# Patient Record
Sex: MALE | Race: WHITE | NOT HISPANIC OR LATINO | URBAN - METROPOLITAN AREA
[De-identification: names, ages, dates, MRNs, and addresses within clinical notes are randomized per-mention and may not be internally consistent; named-entity substitution may affect disease eponyms.]

---

## 2019-03-26 ENCOUNTER — EMERGENCY (EMERGENCY)
Facility: HOSPITAL | Age: 49
LOS: 1 days | Discharge: HOME | End: 2019-03-26
Attending: EMERGENCY MEDICINE
Payer: COMMERCIAL

## 2019-03-26 VITALS
HEART RATE: 74 BPM | RESPIRATION RATE: 18 BRPM | HEIGHT: 72 IN | TEMPERATURE: 98 F | OXYGEN SATURATION: 99 % | WEIGHT: 270.07 LBS | DIASTOLIC BLOOD PRESSURE: 125 MMHG

## 2019-03-26 VITALS — DIASTOLIC BLOOD PRESSURE: 71 MMHG | SYSTOLIC BLOOD PRESSURE: 113 MMHG

## 2019-03-26 PROCEDURE — 96375 TX/PRO/DX INJ NEW DRUG ADDON: CPT

## 2019-03-26 PROCEDURE — 99284 EMERGENCY DEPT VISIT MOD MDM: CPT | Mod: 25

## 2019-03-26 PROCEDURE — 99284 EMERGENCY DEPT VISIT MOD MDM: CPT

## 2019-03-26 PROCEDURE — 72100 X-RAY EXAM L-S SPINE 2/3 VWS: CPT | Mod: 26

## 2019-03-26 PROCEDURE — 72100 X-RAY EXAM L-S SPINE 2/3 VWS: CPT

## 2019-03-26 PROCEDURE — 96374 THER/PROPH/DIAG INJ IV PUSH: CPT

## 2019-03-26 RX ORDER — ACETAMINOPHEN 500 MG
650 TABLET ORAL ONCE
Qty: 0 | Refills: 0 | Status: COMPLETED | OUTPATIENT
Start: 2019-03-26 | End: 2019-03-26

## 2019-03-26 RX ORDER — METHOCARBAMOL 500 MG/1
1500 TABLET, FILM COATED ORAL ONCE
Qty: 0 | Refills: 0 | Status: COMPLETED | OUTPATIENT
Start: 2019-03-26 | End: 2019-03-26

## 2019-03-26 RX ORDER — TRAMADOL HYDROCHLORIDE 50 MG/1
1 TABLET ORAL
Qty: 12 | Refills: 0
Start: 2019-03-26 | End: 2019-03-28

## 2019-03-26 RX ORDER — MORPHINE SULFATE 50 MG/1
4 CAPSULE, EXTENDED RELEASE ORAL ONCE
Qty: 0 | Refills: 0 | Status: DISCONTINUED | OUTPATIENT
Start: 2019-03-26 | End: 2019-03-26

## 2019-03-26 RX ORDER — KETOROLAC TROMETHAMINE 30 MG/ML
30 SYRINGE (ML) INJECTION ONCE
Qty: 0 | Refills: 0 | Status: DISCONTINUED | OUTPATIENT
Start: 2019-03-26 | End: 2019-03-26

## 2019-03-26 RX ADMIN — Medication 125 MILLIGRAM(S): at 17:49

## 2019-03-26 RX ADMIN — MORPHINE SULFATE 4 MILLIGRAM(S): 50 CAPSULE, EXTENDED RELEASE ORAL at 17:50

## 2019-03-26 RX ADMIN — Medication 650 MILLIGRAM(S): at 16:36

## 2019-03-26 RX ADMIN — Medication 30 MILLIGRAM(S): at 16:36

## 2019-03-26 RX ADMIN — METHOCARBAMOL 1500 MILLIGRAM(S): 500 TABLET, FILM COATED ORAL at 16:36

## 2019-03-26 NOTE — ED PROVIDER NOTE - CLINICAL SUMMARY MEDICAL DECISION MAKING FREE TEXT BOX
Patient s/p fall down stairs with back pain no signs of trauma.  Xray  negative for fracture.  Patient given medication for pain with improvement in the ER.  he was discharged with medrol dosepack and pain medication.

## 2019-03-26 NOTE — ED ADULT NURSE NOTE - OBJECTIVE STATEMENT
Pt  arrives to ED s/p slip and fall down 4-5 steps in to his basement. Pt states he slipped on a toy while carrying water. Pt states he has hx of back pain and sees pain management and this fall has exacerbated his pain. Pt A & OX4, denies LOC. PT A & O X4.  No obvious signs of trauma noted.

## 2019-03-26 NOTE — ED PROVIDER NOTE - OBJECTIVE STATEMENT
This patient is a 48 year old man with hx of herniated lumbar discs who presents to the ER c/o back pain s/p fall.  Patient states that he tripped over his child's toy that was on the stairs and fell down about 6 steps hitting his lower back.  No LOC.  He states that he did not hit his head.  Pain reported to be 8/10 in severity in his lower back and radiating down his left leg.

## 2019-03-26 NOTE — ED ADULT TRIAGE NOTE - CHIEF COMPLAINT QUOTE
pt a&ox3, states slipped and fell down 6 basement steps, reports lower back pain. denies hitting head or LOC.

## 2019-09-12 ENCOUNTER — EMERGENCY (EMERGENCY)
Facility: HOSPITAL | Age: 49
LOS: 1 days | Discharge: HOME | End: 2019-09-12
Attending: STUDENT IN AN ORGANIZED HEALTH CARE EDUCATION/TRAINING PROGRAM
Payer: COMMERCIAL

## 2019-09-12 VITALS
HEIGHT: 72 IN | DIASTOLIC BLOOD PRESSURE: 93 MMHG | SYSTOLIC BLOOD PRESSURE: 157 MMHG | OXYGEN SATURATION: 99 % | TEMPERATURE: 99 F | HEART RATE: 74 BPM | WEIGHT: 271.17 LBS | RESPIRATION RATE: 18 BRPM

## 2019-09-12 VITALS
DIASTOLIC BLOOD PRESSURE: 66 MMHG | SYSTOLIC BLOOD PRESSURE: 130 MMHG | HEART RATE: 74 BPM | RESPIRATION RATE: 18 BRPM | OXYGEN SATURATION: 98 %

## 2019-09-12 PROBLEM — M54.9 DORSALGIA, UNSPECIFIED: Chronic | Status: ACTIVE | Noted: 2019-03-26

## 2019-09-12 LAB
ALBUMIN SERPL ELPH-MCNC: 4.5 G/DL — SIGNIFICANT CHANGE UP (ref 3.3–5.2)
ALP SERPL-CCNC: 126 U/L — HIGH (ref 40–120)
ALT FLD-CCNC: 76 U/L — HIGH
ANION GAP SERPL CALC-SCNC: 13 MMOL/L — SIGNIFICANT CHANGE UP (ref 5–17)
APTT BLD: 33.3 SEC — SIGNIFICANT CHANGE UP (ref 27.5–36.3)
AST SERPL-CCNC: 45 U/L — HIGH
BASOPHILS # BLD AUTO: 0.03 K/UL — SIGNIFICANT CHANGE UP (ref 0–0.2)
BASOPHILS NFR BLD AUTO: 0.5 % — SIGNIFICANT CHANGE UP (ref 0–2)
BILIRUB SERPL-MCNC: 0.5 MG/DL — SIGNIFICANT CHANGE UP (ref 0.4–2)
BUN SERPL-MCNC: 21 MG/DL — HIGH (ref 8–20)
CALCIUM SERPL-MCNC: 9.1 MG/DL — SIGNIFICANT CHANGE UP (ref 8.6–10.2)
CHLORIDE SERPL-SCNC: 100 MMOL/L — SIGNIFICANT CHANGE UP (ref 98–107)
CO2 SERPL-SCNC: 25 MMOL/L — SIGNIFICANT CHANGE UP (ref 22–29)
CREAT SERPL-MCNC: 1.01 MG/DL — SIGNIFICANT CHANGE UP (ref 0.5–1.3)
EOSINOPHIL # BLD AUTO: 0.2 K/UL — SIGNIFICANT CHANGE UP (ref 0–0.5)
EOSINOPHIL NFR BLD AUTO: 3.1 % — SIGNIFICANT CHANGE UP (ref 0–6)
GLUCOSE SERPL-MCNC: 130 MG/DL — HIGH (ref 70–115)
HCT VFR BLD CALC: 43.7 % — SIGNIFICANT CHANGE UP (ref 39–50)
HGB BLD-MCNC: 14.8 G/DL — SIGNIFICANT CHANGE UP (ref 13–17)
IMM GRANULOCYTES NFR BLD AUTO: 0.3 % — SIGNIFICANT CHANGE UP (ref 0–1.5)
INR BLD: 0.96 RATIO — SIGNIFICANT CHANGE UP (ref 0.88–1.16)
LYMPHOCYTES # BLD AUTO: 2.21 K/UL — SIGNIFICANT CHANGE UP (ref 1–3.3)
LYMPHOCYTES # BLD AUTO: 33.9 % — SIGNIFICANT CHANGE UP (ref 13–44)
MCHC RBC-ENTMCNC: 30.6 PG — SIGNIFICANT CHANGE UP (ref 27–34)
MCHC RBC-ENTMCNC: 33.9 GM/DL — SIGNIFICANT CHANGE UP (ref 32–36)
MCV RBC AUTO: 90.5 FL — SIGNIFICANT CHANGE UP (ref 80–100)
MONOCYTES # BLD AUTO: 0.64 K/UL — SIGNIFICANT CHANGE UP (ref 0–0.9)
MONOCYTES NFR BLD AUTO: 9.8 % — SIGNIFICANT CHANGE UP (ref 2–14)
NEUTROPHILS # BLD AUTO: 3.41 K/UL — SIGNIFICANT CHANGE UP (ref 1.8–7.4)
NEUTROPHILS NFR BLD AUTO: 52.4 % — SIGNIFICANT CHANGE UP (ref 43–77)
PLATELET # BLD AUTO: 233 K/UL — SIGNIFICANT CHANGE UP (ref 150–400)
POTASSIUM SERPL-MCNC: 4 MMOL/L — SIGNIFICANT CHANGE UP (ref 3.5–5.3)
POTASSIUM SERPL-SCNC: 4 MMOL/L — SIGNIFICANT CHANGE UP (ref 3.5–5.3)
PROT SERPL-MCNC: 7.3 G/DL — SIGNIFICANT CHANGE UP (ref 6.6–8.7)
PROTHROM AB SERPL-ACNC: 11 SEC — SIGNIFICANT CHANGE UP (ref 10–12.9)
RBC # BLD: 4.83 M/UL — SIGNIFICANT CHANGE UP (ref 4.2–5.8)
RBC # FLD: 12.6 % — SIGNIFICANT CHANGE UP (ref 10.3–14.5)
SODIUM SERPL-SCNC: 138 MMOL/L — SIGNIFICANT CHANGE UP (ref 135–145)
TROPONIN T SERPL-MCNC: <0.01 NG/ML — SIGNIFICANT CHANGE UP (ref 0–0.06)
WBC # BLD: 6.51 K/UL — SIGNIFICANT CHANGE UP (ref 3.8–10.5)
WBC # FLD AUTO: 6.51 K/UL — SIGNIFICANT CHANGE UP (ref 3.8–10.5)

## 2019-09-12 PROCEDURE — 93005 ELECTROCARDIOGRAM TRACING: CPT

## 2019-09-12 PROCEDURE — 82962 GLUCOSE BLOOD TEST: CPT

## 2019-09-12 PROCEDURE — 70450 CT HEAD/BRAIN W/O DYE: CPT

## 2019-09-12 PROCEDURE — 71045 X-RAY EXAM CHEST 1 VIEW: CPT | Mod: 26

## 2019-09-12 PROCEDURE — 70450 CT HEAD/BRAIN W/O DYE: CPT | Mod: 26

## 2019-09-12 PROCEDURE — 85027 COMPLETE CBC AUTOMATED: CPT

## 2019-09-12 PROCEDURE — 96375 TX/PRO/DX INJ NEW DRUG ADDON: CPT

## 2019-09-12 PROCEDURE — 93010 ELECTROCARDIOGRAM REPORT: CPT

## 2019-09-12 PROCEDURE — 80053 COMPREHEN METABOLIC PANEL: CPT

## 2019-09-12 PROCEDURE — 85610 PROTHROMBIN TIME: CPT

## 2019-09-12 PROCEDURE — 84484 ASSAY OF TROPONIN QUANT: CPT

## 2019-09-12 PROCEDURE — 96374 THER/PROPH/DIAG INJ IV PUSH: CPT

## 2019-09-12 PROCEDURE — 85730 THROMBOPLASTIN TIME PARTIAL: CPT

## 2019-09-12 PROCEDURE — 99284 EMERGENCY DEPT VISIT MOD MDM: CPT

## 2019-09-12 PROCEDURE — 99285 EMERGENCY DEPT VISIT HI MDM: CPT | Mod: 25

## 2019-09-12 PROCEDURE — 36415 COLL VENOUS BLD VENIPUNCTURE: CPT

## 2019-09-12 PROCEDURE — 71045 X-RAY EXAM CHEST 1 VIEW: CPT

## 2019-09-12 RX ORDER — METOCLOPRAMIDE HCL 10 MG
10 TABLET ORAL ONCE
Refills: 0 | Status: COMPLETED | OUTPATIENT
Start: 2019-09-12 | End: 2019-09-12

## 2019-09-12 RX ORDER — ACETAMINOPHEN 500 MG
975 TABLET ORAL ONCE
Refills: 0 | Status: COMPLETED | OUTPATIENT
Start: 2019-09-12 | End: 2019-09-12

## 2019-09-12 RX ORDER — KETOROLAC TROMETHAMINE 30 MG/ML
15 SYRINGE (ML) INJECTION ONCE
Refills: 0 | Status: DISCONTINUED | OUTPATIENT
Start: 2019-09-12 | End: 2019-09-12

## 2019-09-12 RX ADMIN — Medication 10 MILLIGRAM(S): at 01:00

## 2019-09-12 RX ADMIN — Medication 15 MILLIGRAM(S): at 03:44

## 2019-09-12 RX ADMIN — Medication 975 MILLIGRAM(S): at 01:51

## 2019-09-12 NOTE — ED ADULT TRIAGE NOTE - CHIEF COMPLAINT QUOTE
patient biba states that approx 30 minutes ago states that he was working when he started to have a pounding headache, blurry vision in his right eye, became dizzy and nauseous. patient states that the blurry vision is improving. dr erickson at [facility] for ang at 0012

## 2019-09-12 NOTE — ED ADULT NURSE NOTE - CHIEF COMPLAINT QUOTE
patient biba states that approx 30 minutes ago states that he was working when he started to have a pounding headache, blurry vision in his right eye, became dizzy and nauseous. patient states that the blurry vision is improving. dr erickson at [facility] for ang at 0012

## 2019-09-12 NOTE — ED ADULT NURSE NOTE - OBJECTIVE STATEMENT
Patient presents to ER complaining of headache and right sided blurry vision, onset of symptoms about 1 hr ago, equal strength noted in all extremities, no droop noted, resp even/unlabored, lungs CTAB.

## 2019-09-12 NOTE — ED PROVIDER NOTE - PATIENT PORTAL LINK FT
You can access the FollowMyHealth Patient Portal offered by [facility] by registering at the following website: [email or web address]. By joining [employer]’s FollowMyHealth portal, you will also be able to view your health information using other applications (apps) compatible with our system.

## 2019-09-12 NOTE — ED PROVIDER NOTE - OBJECTIVE STATEMENT
48 y/o M with no significant PMHx presents to ED c/o severe HA with gradual onset 30 minutes ago getting worse. Associated sxs are right sided blurry vision and nausea. Pt was at [employer], where he experienced sudden CINTHIA HA with right sided blurry vision. HA was initially frontal, then spread to posterior region. Pt denies fevers/chills, head trauma, loc, focal neuro deficits, cp/sob/palp, cough, abd pain/v/d, urinary symptoms, recent travel and sick contacts.   Last Known Well: 11:40 pm

## 2019-09-12 NOTE — ED PROVIDER NOTE - PROGRESS NOTE DETAILS
MD arrived at bedside Spoke with neurology, Tami Jacobo NP. Will have telestroke neurologist call back. Delay of CT scan due to pt on table Spoke with Dr. Zavala, will cancel code stroke since pt is only experiencing blurred vision at this time, no visual field deficits, pt is not a TPA candidate at this time PT seen and reassessed.  Patient symptomatically improved.   AAOX3, NAD, VSS.  Discussed test results w/ patient, given copy of results. Patient verbalized understanding of hospital course and outpatient plans, has decisional making capacity.  Will follow-up with Primary care doctor in the next 2 days; patient will call for an appointment. Will return to the ED if there is any worsening of symptoms.  Patient able to ambulate w/o difficulty, is tolerating PO intake

## 2019-09-12 NOTE — ED ADULT NURSE REASSESSMENT NOTE - NS ED NURSE REASSESS COMMENT FT1
Pt. safe for discharge as per provider. Vital signs stable and as charted. Pt. at baseline neuro status. Discussed discharge teaching with pt, pt verbalized understanding. IV catheter discontinued. Pt. discharged as per orders.

## 2019-09-12 NOTE — ED PROVIDER NOTE - CLINICAL SUMMARY MEDICAL DECISION MAKING FREE TEXT BOX
48 y/o M with no significant PMHx presents to ED c/o severe HA with gradual onset 30 minutes ago getting worse. 48 y/o M with no significant PMHx presents to ED c/o severe HA with gradual onset 30 minutes ago getting worse - pt likely with complex migraine - improved with meds, no acute findings on CT, no actual visual field deficit, follow up with pmd and neuro

## 2020-01-02 ENCOUNTER — EMERGENCY (EMERGENCY)
Facility: HOSPITAL | Age: 50
LOS: 1 days | Discharge: HOME | End: 2020-01-02
Attending: EMERGENCY MEDICINE
Payer: COMMERCIAL

## 2020-01-02 VITALS
WEIGHT: 210.1 LBS | HEIGHT: 71 IN | DIASTOLIC BLOOD PRESSURE: 66 MMHG | RESPIRATION RATE: 16 BRPM | HEART RATE: 71 BPM | OXYGEN SATURATION: 95 % | TEMPERATURE: 98 F | SYSTOLIC BLOOD PRESSURE: 118 MMHG

## 2020-01-02 PROCEDURE — 96374 THER/PROPH/DIAG INJ IV PUSH: CPT

## 2020-01-02 PROCEDURE — 96375 TX/PRO/DX INJ NEW DRUG ADDON: CPT

## 2020-01-02 PROCEDURE — 99284 EMERGENCY DEPT VISIT MOD MDM: CPT

## 2020-01-02 PROCEDURE — 99284 EMERGENCY DEPT VISIT MOD MDM: CPT | Mod: 25

## 2020-01-02 RX ORDER — KETOROLAC TROMETHAMINE 30 MG/ML
30 SYRINGE (ML) INJECTION ONCE
Refills: 0 | Status: DISCONTINUED | OUTPATIENT
Start: 2020-01-02 | End: 2020-01-02

## 2020-01-02 RX ORDER — DIAZEPAM 5 MG
5 TABLET ORAL ONCE
Refills: 0 | Status: DISCONTINUED | OUTPATIENT
Start: 2020-01-02 | End: 2020-01-02

## 2020-01-02 RX ORDER — CYCLOBENZAPRINE HYDROCHLORIDE 10 MG/1
1 TABLET, FILM COATED ORAL
Qty: 9 | Refills: 0
Start: 2020-01-02 | End: 2020-01-04

## 2020-01-02 RX ORDER — DEXAMETHASONE 0.5 MG/5ML
10 ELIXIR ORAL ONCE
Refills: 0 | Status: COMPLETED | OUTPATIENT
Start: 2020-01-02 | End: 2020-01-02

## 2020-01-02 RX ADMIN — Medication 5 MILLIGRAM(S): at 18:41

## 2020-01-02 RX ADMIN — Medication 30 MILLIGRAM(S): at 18:42

## 2020-01-02 RX ADMIN — Medication 10 MILLIGRAM(S): at 18:45

## 2020-01-02 NOTE — ED PROVIDER NOTE - PATIENT PORTAL LINK FT
You can access the FollowMyHealth Patient Portal offered by [facility] by registering at the following website: [email or web address]. By joining [employer]’s FollowMyHealth portal, you will also be able to view your health information using other applications (apps) compatible with our system.

## 2020-01-02 NOTE — ED PROVIDER NOTE - PROGRESS NOTE DETAILS
PA NOTE: improvement in sx s/p treatment. Will treat with course of PO flexeril. Advised to make follow up with primary neuro surgeon / pain management MD upon discharge and return to ED if symptoms worsen.

## 2020-01-02 NOTE — ED PROVIDER NOTE - PHYSICAL EXAMINATION
MSK: Ambulating independently in ED. TTP L sided paraspinal muscle at thoracic region. 5/5 str and full ROM b/l upper and lower extrems.

## 2020-01-02 NOTE — ED PROVIDER NOTE - CLINICAL SUMMARY MEDICAL DECISION MAKING FREE TEXT BOX
49m with acute exacerbation of cryonic back pain. No neuro deficits. VSS. Ambulating independently in ED. Will treat pain and reassess.

## 2020-01-02 NOTE — ED ADULT TRIAGE NOTE - CHIEF COMPLAINT QUOTE
Patient A&Ox4 complaining of back pain, chronic in nature, stated has open workers comprehension case secondary to sustaining back injury while at work. Stated pain worse today, did not take anything prior to calling ambulance, received 100mcg Fentanyl by EMS PTA. Has 20g to right AC.

## 2020-01-02 NOTE — ED PROVIDER NOTE - OBJECTIVE STATEMENT
Pt is a 48 y/o m, PMH significant for chronic back pain, presents to ED c/o exacerbation of lower back pain x 1 day. Pt states he had chronic lower back pain stemming from a injury at work in 2012. Pt currently sees Dr. Mae ( Neuro sx ) for his back pain and recently received a nerve block this November. Pt states he worked overnight ( no heavy lifting / strenuous activity ) and woke up at approximately 1:00pm with severe pain in his lower back radiating into his LLE. Pt states the pain is similar to symptoms he has experienced in the past. No other complaints at this time. Denies fevers, chills, nausea, vomiting, HA, dizziness, recent weight loss, weakness.

## 2020-01-02 NOTE — ED PROVIDER NOTE - ATTENDING CONTRIBUTION TO CARE
Pt is a 48 y/o m, chronic back pain, presents to ED c/o exacerbation of lower back pain x 1 day. 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 Pt currently sees Dr. Mae ( Neuro sx ) for his back pain and recently received a nerve block this November. Pt states he worked overnight ( no heavy lifting / strenuous activity ) and woke up at approximately 1:00pm with severe pain in his lower back radiating into his LLE.   Similar to past sx, no b/b incontinence  plan pain control and out fu--pe positive for l paraspinal muslce spasm in t/l/s spine

## 2020-09-30 ENCOUNTER — EMERGENCY (EMERGENCY)
Facility: HOSPITAL | Age: 50
LOS: 1 days | Discharge: HOME | End: 2020-09-30
Attending: EMERGENCY MEDICINE
Payer: COMMERCIAL

## 2020-09-30 VITALS
HEART RATE: 77 BPM | TEMPERATURE: 98 F | RESPIRATION RATE: 18 BRPM | SYSTOLIC BLOOD PRESSURE: 157 MMHG | HEIGHT: 71 IN | OXYGEN SATURATION: 98 % | DIASTOLIC BLOOD PRESSURE: 91 MMHG

## 2020-09-30 PROCEDURE — 99284 EMERGENCY DEPT VISIT MOD MDM: CPT | Mod: 25

## 2020-09-30 PROCEDURE — 72128 CT CHEST SPINE W/O DYE: CPT | Mod: 26

## 2020-09-30 PROCEDURE — 99285 EMERGENCY DEPT VISIT HI MDM: CPT

## 2020-09-30 PROCEDURE — 96372 THER/PROPH/DIAG INJ SC/IM: CPT

## 2020-09-30 PROCEDURE — 99053 MED SERV 10PM-8AM 24 HR FAC: CPT

## 2020-09-30 PROCEDURE — 72125 CT NECK SPINE W/O DYE: CPT

## 2020-09-30 PROCEDURE — 72125 CT NECK SPINE W/O DYE: CPT | Mod: 26

## 2020-09-30 PROCEDURE — 72128 CT CHEST SPINE W/O DYE: CPT

## 2020-09-30 RX ORDER — IBUPROFEN 200 MG
800 TABLET ORAL ONCE
Refills: 0 | Status: COMPLETED | OUTPATIENT
Start: 2020-09-30 | End: 2020-09-30

## 2020-09-30 RX ORDER — DIAZEPAM 5 MG
10 TABLET ORAL ONCE
Refills: 0 | Status: DISCONTINUED | OUTPATIENT
Start: 2020-09-30 | End: 2020-09-30

## 2020-09-30 RX ORDER — DEXAMETHASONE 0.5 MG/5ML
10 ELIXIR ORAL ONCE
Refills: 0 | Status: COMPLETED | OUTPATIENT
Start: 2020-09-30 | End: 2020-09-30

## 2020-09-30 RX ORDER — OXYCODONE AND ACETAMINOPHEN 5; 325 MG/1; MG/1
1 TABLET ORAL
Qty: 18 | Refills: 0
Start: 2020-09-30 | End: 2020-10-02

## 2020-09-30 RX ORDER — CYCLOBENZAPRINE HYDROCHLORIDE 10 MG/1
1 TABLET, FILM COATED ORAL
Qty: 30 | Refills: 0
Start: 2020-09-30 | End: 2020-10-09

## 2020-09-30 RX ADMIN — Medication 10 MILLIGRAM(S): at 04:49

## 2020-09-30 RX ADMIN — Medication 800 MILLIGRAM(S): at 04:43

## 2020-09-30 RX ADMIN — Medication 10 MILLIGRAM(S): at 04:43

## 2020-09-30 NOTE — ED PROVIDER NOTE - PATIENT PORTAL LINK FT
You can access the FollowMyHealth Patient Portal offered by [facility] by registering at the following website: [email or web address]. By joining [employer]’s FollowMyHealth portal, you will also be able to view your health information using other applications (apps) compatible with our system.

## 2020-09-30 NOTE — ED ADULT NURSE NOTE - OBJECTIVE STATEMENT
Pt c/o 8/10 upper back pain radiating to left arm with associated numbness and tingling. 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 Pt immediately felt a stabbing, burning pain. Pt has a hx of lower back herniations and has had previous treatment with epidurals and nerve blocks that has given him relief.

## 2020-09-30 NOTE — ED PROVIDER NOTE - NSFOLLOWUPINSTRUCTIONS_ED_ALL_ED_FT
Anaprox S 2x daily with food as needed for pain  Flexeril 10 mg 3x daily  Medrol dose josué starting this evening with food as instructed  Follow up with your Spine and Pain Management doctor-call to schedule appts  Return sooner for any problems

## 2020-09-30 NOTE — ED ADULT TRIAGE NOTE - CHIEF COMPLAINT QUOTE
Pt was helping lift a "heavy set woman who fell out of her chair and heard a pop in the upper back, pain shooting down the L arm into the L hand and upper back pain."

## 2020-09-30 NOTE — ED PROVIDER NOTE - OBJECTIVE STATEMENT
51 yo M [employer]  presents to ED ambulatory c/o upper mid back pain with LUE tingling after feeling a "pop" after responding to a a call for lift assist of heavy patient approx 45 min PTA.  Pt with hx of herniated discs in lower back L4/5, L5/S1 for which he has been seen by Neurosurgery/Dr. Xavier and has had injections by pain management in the past.  Pt denies any lower back pain or weakness.  no assoc CP, SOB, abd pain, or N/V.

## 2020-09-30 NOTE — ED PROVIDER NOTE - PROGRESS NOTE DETAILS
CT results as noted.  Pt stable for d/c with Spine and Pain Management f/u as outpt.  Rx Anaprox DS, Flexeril and Medrol Dosepack as outpt

## 2021-04-13 ENCOUNTER — APPOINTMENT (OUTPATIENT)
Dept: ORTHOPEDIC SURGERY | Facility: CLINIC | Age: 51
End: 2021-04-13
Payer: COMMERCIAL

## 2021-04-13 VITALS
HEART RATE: 71 BPM | DIASTOLIC BLOOD PRESSURE: 77 MMHG | WEIGHT: 275 LBS | OXYGEN SATURATION: 96 % | SYSTOLIC BLOOD PRESSURE: 138 MMHG | BODY MASS INDEX: 38.5 KG/M2 | HEIGHT: 71 IN

## 2021-04-13 DIAGNOSIS — M25.562 PAIN IN LEFT KNEE: ICD-10-CM

## 2021-04-13 DIAGNOSIS — Z78.9 OTHER SPECIFIED HEALTH STATUS: ICD-10-CM

## 2021-04-13 DIAGNOSIS — Z87.39 PERSONAL HISTORY OF OTHER DISEASES OF THE MUSCULOSKELETAL SYSTEM AND CONNECTIVE TISSUE: ICD-10-CM

## 2021-04-13 DIAGNOSIS — Z87.09 PERSONAL HISTORY OF OTHER DISEASES OF THE RESPIRATORY SYSTEM: ICD-10-CM

## 2021-04-13 PROCEDURE — 99204 OFFICE O/P NEW MOD 45 MIN: CPT

## 2021-04-13 PROCEDURE — 99072 ADDL SUPL MATRL&STAF TM PHE: CPT

## 2021-04-13 PROCEDURE — 73564 X-RAY EXAM KNEE 4 OR MORE: CPT | Mod: LT

## 2021-04-13 RX ORDER — TIZANIDINE 4 MG/1
4 TABLET ORAL
Qty: 63 | Refills: 0 | Status: ACTIVE | COMMUNITY
Start: 2020-10-20

## 2021-04-13 NOTE — DISCUSSION/SUMMARY
[de-identified] : The patient is a 51 year old male with bone on bone varus arthritis of the left knee. Conservative options were discussed. Visco Supplementation was ordered. He was given a prescription for physical therapy and anti-inflammatories. I recommended a course of Mobic. The patient was given a prescription for the Mobic with directions. He was instructed to stop the medicine and call the office if there are any adverse reaction to the medicine. He was also instructed to consult with their primary care doctor prior to starting the medication. He may be a future candidate for left knee replacement once conservative treatment has been exhausted. He may follow up for Visco Supplementation once it is approved.

## 2021-04-13 NOTE — PHYSICAL EXAM
[de-identified] : The patient appears well nourished  and in no apparent distress.  The patient is alert and oriented to person, place, and time.   Affect and mood appear normal. The head is normocephalic and atraumatic.  The eyes reveal normal sclera and extra ocular muscles are intact. The tongue is midline with no apparent lesions.  Skin shows normal turgor with no evidence of eczema or psoriasis.  No respiratory distress noted.  Sensation grossly intact.\par   [de-identified] : Exam of the left knee shows a small effusion, 5 degrees of varus, less than 3 mm laxity of the LCL, -5 to 110 degrees of flexion measured with a goniometer. 5/5 motor strength bilaterally distally. Sensation intact distally.  [de-identified] : Xray- 4 views of the left knee shows bone on bone varus arthritis of the left knee.\par \par MRI - performed at [facility] on 4/13/2021 of the left knee- \par \par  Delamination of a small near full-thickness chondral flap at the posterior weightbearing aspect of the lateral femoral condyle and a shallow radial tear at the free edge of the lateral meniscus.\par \par There is moderate to severe medial compartment osteoarthritis and prominent truncation of the posterior horn of the medial meniscus is indicative of a chronic tear. Meniscal body is extruded into the medial gutter. There is reactive bone ,marrow edema within the medial tibial plateau.\par \par Moderate patellofemoral arthritis.

## 2021-04-13 NOTE — ADDENDUM
[FreeTextEntry1] : This note was authored by Khris Delatorre working as a medical scribe for Dr. Ronan Calvo. The note was reviewed, edited, and revised by Dr. Ronan Calvo whom is in agreement with the exam findings, imaging findings, and treatment plan. 04/13/2021.

## 2021-04-13 NOTE — HISTORY OF PRESENT ILLNESS
[de-identified] : The patient is a 51 year old male being seen for evaluation of his left. He reports on April 3rd waking up with significant pain. He reports seeing is primary care physician and being sent for an MRI. He reports being diagnosed with osteoarthritis and being referred to orthopedics for evaluation. He is ambulating and transferring with episodes of severe pain. He reports pain is localized to the medial knee. He reports intermittent swelling and aching sensations. Pain is worse with transferring and weightbearing activities, most especially stair climbing. He reports undergoing physical therapy with mild relief of his symptoms. He comes in today for evaluation of his left knee and for treatment options.

## 2021-04-29 ENCOUNTER — RX RENEWAL (OUTPATIENT)
Age: 51
End: 2021-04-29

## 2021-06-01 ENCOUNTER — APPOINTMENT (OUTPATIENT)
Dept: ORTHOPEDIC SURGERY | Facility: CLINIC | Age: 51
End: 2021-06-01
Payer: COMMERCIAL

## 2021-06-01 VITALS — BODY MASS INDEX: 38.5 KG/M2 | HEIGHT: 71 IN | WEIGHT: 275 LBS

## 2021-06-01 PROCEDURE — 20610 DRAIN/INJ JOINT/BURSA W/O US: CPT | Mod: LT

## 2021-06-01 PROCEDURE — 99213 OFFICE O/P EST LOW 20 MIN: CPT | Mod: 25

## 2021-06-01 PROCEDURE — 99072 ADDL SUPL MATRL&STAF TM PHE: CPT

## 2021-06-01 RX ORDER — HYALURONATE SODIUM 10 MG/ML
25 SYRINGE (ML) INTRAARTICULAR
Qty: 5 | Refills: 0 | Status: DISCONTINUED | OUTPATIENT
Start: 2021-05-06 | End: 2021-06-01

## 2021-06-01 RX ORDER — HYALURONATE SODIUM 20 MG/2 ML
20 SYRINGE (ML) INTRAARTICULAR
Qty: 1 | Refills: 0 | Status: DISCONTINUED | OUTPATIENT
Start: 2021-05-13 | End: 2021-06-01

## 2021-06-01 NOTE — DISCUSSION/SUMMARY
[de-identified] : The patient is a 51 year old male with bone on bone varus arthritis of the left knee. Conservative options were discussed. Patient received the first of three Euflexxa injections to the left knee using sterile technique and ultrasound guidance. He tolerated well. Patient will ice and elevate the knee when home. Follow up in one week.

## 2021-06-01 NOTE — REASON FOR VISIT
[Follow-Up Visit] : a follow-up visit for [Other: ____] : [unfilled] [FreeTextEntry2] : Left knee Euflexxa inj#1. Lot# [id number] Expires on 2021/11/14.

## 2021-06-01 NOTE — PHYSICAL EXAM
[de-identified] : The patient appears well nourished  and in no apparent distress.  The patient is alert and oriented to person, place, and time.   Affect and mood appear normal. The head is normocephalic and atraumatic.  The eyes reveal normal sclera and extra ocular muscles are intact. The tongue is midline with no apparent lesions.  Skin shows normal turgor with no evidence of eczema or psoriasis.  No respiratory distress noted.  Sensation grossly intact.\par   [de-identified] : Exam of the left knee shows a small effusion, 5 degrees of varus, less than 3 mm laxity of the LCL, -5 to 110 degrees of flexion measured with a goniometer. 5/5 motor strength bilaterally distally. Sensation intact distally.  [de-identified] : Previous Xray- 4 views of the left knee shows bone on bone varus arthritis of the left knee.\par \par MRI - performed at [facility] on 4/13/2021 of the left knee- \par \par  Delamination of a small near full-thickness chondral flap at the posterior weightbearing aspect of the lateral femoral condyle and a shallow radial tear at the free edge of the lateral meniscus.\par \par There is moderate to severe medial compartment osteoarthritis and prominent truncation of the posterior horn of the medial meniscus is indicative of a chronic tear. Meniscal body is extruded into the medial gutter. There is reactive bone ,marrow edema within the medial tibial plateau.\par \par Moderate patellofemoral arthritis.

## 2021-06-01 NOTE — HISTORY OF PRESENT ILLNESS
[de-identified] : The patient is a 51 year old male being seen for evaluation of his left. He reports on April 3rd waking up with significant pain. He reports seeing is primary care physician and being sent for an MRI. He reports being diagnosed with osteoarthritis and being referred to orthopedics for evaluation. He is ambulating and transferring with episodes of severe pain. He reports pain is localized to the medial knee. He reports intermittent swelling and aching sensations. Pain is worse with transferring and weightbearing activities, most especially stair climbing. He reports undergoing physical therapy with mild relief of his symptoms. He comes in today for the first of three Euflexxa injections to the left knee.

## 2021-06-01 NOTE — PROCEDURE
[de-identified] : Allergies: The patient denies allergies to medications and has no allergies to chicken,eggs, or feathers.\par Procedure: The patient has been identified by name and date of birth. Patient confirms that we are treating the left knee today.\par The knee was prepped in the usual sterile fashion. The knee joint space was identified using ultrasound. The area was cleansed with chlorhexadine, then sprayed with ethyl chloride. The patient was then injected with the Euflexxa into the left knee using ultrasound guidance  and the needle position in the superolateral joint space was confirmed. The patient tolerated the procedure well. The medication was delivered aseptically and atraumatically.\par Diagnosis: Osteoarthritis of the left knee. \par Treatment: The patient was advised on the activities for today. I gave the patient instructions on postinjection ice and analgesia.\par

## 2021-06-08 ENCOUNTER — APPOINTMENT (OUTPATIENT)
Dept: ORTHOPEDIC SURGERY | Facility: CLINIC | Age: 51
End: 2021-06-08
Payer: COMMERCIAL

## 2021-06-08 VITALS
HEIGHT: 71 IN | HEART RATE: 67 BPM | DIASTOLIC BLOOD PRESSURE: 69 MMHG | BODY MASS INDEX: 38.5 KG/M2 | WEIGHT: 275 LBS | SYSTOLIC BLOOD PRESSURE: 118 MMHG

## 2021-06-08 PROCEDURE — 99072 ADDL SUPL MATRL&STAF TM PHE: CPT

## 2021-06-08 PROCEDURE — 20610 DRAIN/INJ JOINT/BURSA W/O US: CPT

## 2021-06-08 NOTE — HISTORY OF PRESENT ILLNESS
[de-identified] : \par Patient presents today for second Euflexxa injection of the left knee.  He reports that he is started to notice some improvement in his left knee pain.  No postinjection complications are reported.\par \par Procedure site: \par Indication:  Osteoarthritis. \par Risk and benefits were discussed with the patient. Potential complications include bleeding and infection. Verbal consent was obtained prior to the procedure. \par Chloraprep was used to prep the area. ethyl chloride spray was used as a topical anesthetic. Using sterile technique, the aspiration/injection needle was then directed from a inferior lateral aspect. A 22-gauge was used to inject 2mL Euflexxa lot number [id number]. A bandage was applied. the patient tolerated the procedure well. \par Complications: none. \par Patient instructed to avoid strenuous activity for 1 day(s). \par Follow-up in the office in 1 week for third injection. \par

## 2021-06-08 NOTE — REASON FOR VISIT
[Follow-Up Visit] : a follow-up visit for [Other: ____] : [unfilled] [FreeTextEntry2] : Left knee Euflexxa inj#2. Lot# [id number] Expires on 2021/11/14. \par  \par

## 2021-06-15 ENCOUNTER — APPOINTMENT (OUTPATIENT)
Dept: ORTHOPEDIC SURGERY | Facility: CLINIC | Age: 51
End: 2021-06-15
Payer: COMMERCIAL

## 2021-06-15 VITALS
HEIGHT: 71 IN | WEIGHT: 275 LBS | BODY MASS INDEX: 38.5 KG/M2 | SYSTOLIC BLOOD PRESSURE: 118 MMHG | DIASTOLIC BLOOD PRESSURE: 75 MMHG | HEART RATE: 60 BPM

## 2021-06-15 PROCEDURE — 99072 ADDL SUPL MATRL&STAF TM PHE: CPT

## 2021-06-15 PROCEDURE — 20611 DRAIN/INJ JOINT/BURSA W/US: CPT | Mod: LT

## 2021-06-15 NOTE — HISTORY OF PRESENT ILLNESS
[de-identified] : Patient is here for the third Euflexxa injection for the left knees. \par Allergies: The patient denies allergies to medications and has no allergies to chicken,eggs, or feathers.\par Procedure: The patient has been identified by name and date of birth. Patient confirms that we are treating the left knee today.\par The knee was prepped in the usual sterile fashion. The knee joint space was identified using ultrasound. The area was cleansed with chlorhexadine, then sprayed with ethyl chloride. The patient was then injected with the Euflexxa into the left knee using ultrasound guidance  and the needle position in the superolateral joint space was confirmed. The patient tolerated the procedure well. The medication was delivered aseptically and atraumatically.\par Diagnosis: Osteoarthritis of the left knee. \par Treatment: The patient was advised on the activities for today. I gave the patient instructions on postinjection ice and analgesia.\par The patient will follow up in 6-8 weeks

## 2021-06-15 NOTE — REASON FOR VISIT
[Follow-Up Visit] : a follow-up visit for [Other: ____] : [unfilled] [FreeTextEntry2] : Left knee Euflexxa inj#3. Lot# [id number] Expires on 2021/11/14. \par  \par

## 2021-10-20 NOTE — ED PROVIDER NOTE - CPE EDP ENMT NORM
I have reviewed and agree to the content of the note written by the PTA.   Electronically signed by Domonique Drake PT 9015 normal...

## 2021-10-29 NOTE — ED ADULT NURSE NOTE - CAS TRG GENERAL NORM CIRC DET
10/28/21    Internal medicine progress note.    Subjective:  Awake, appears comfortable.  Remains confused.  Denies chest pain or shortness of breath.        Visit Vitals  BP 99/68 (BP Location: LUE - Left upper extremity, Patient Position: Semi-Chirinos's)   Pulse (!) 115   Temp 97.2 °F (36.2 °C) (Oral)   Resp 18   Ht 5' 11\" (1.803 m)   Wt 75.3 kg (166 lb 0.1 oz)   SpO2 98%   BMI 23.15 kg/m²     Skin and mucosa are dry  PICC has been placed.  Neck is supple JVDs present.  Lungs show coarse breath some bilaterally with significant bilateral crackles mild wheeze heart S1-S2 with displaced PMI systolic murmur.  Abdomen is soft possible positive. Peg is present.  CNS  patient is awak is moving both upper and lower extremities cranial nerves are grossly intact.  Gait was not assessed.    Recent Labs   Lab 10/26/21  0429 10/25/21  0404 10/24/21  0315   WBC 9.0 7.3 7.5   HGB 13.6 13.4 13.2   HCT 44.0 42.0 41.9   PLT 97* 103* 93*     Recent Labs   Lab 10/28/21  1650 10/28/21  0807 10/28/21  0410 10/27/21  0358 10/26/21  0429   SODIUM  --   --  140 141 146*   POTASSIUM 5.1 5.2* 5.8* 4.4 4.3   CHLORIDE  --   --  102 103 108*   CO2  --   --  35* 35* 32   BUN  --   --  87* 81* 73*   CREATININE  --   --  1.95* 1.82* 1.74*   GLUCOSE  --   --  89 100* 139*   CALCIUM  --   --  9.3 9.2 9.4       Impression:   A41.9 Sepsis, unspecified organism present on admission  J18.9 Pneumonia, unspecified organism  N17.9 Acute kidney failure, unspecified  N18.4 Chronic kidney disease, stage 4 (severe)  I50.22 Chronic systolic (congestive) heart failure  D64.9 Anemia, unspecified  D69.6 Thrombocytopenia, unspecified ITP.     Plan:  Transfer to medical unit .    S/p peg   Externally removable PEG tube placement.  Mild gastritis  Nonobstructing Schatzki ring    He also remains on aspirin Lipitor.    Continue Lasix 40 mg p.o. b.i.d..    Levothyroxine 25 mcg p.o. every day Lovenox 80 mg subQ daily.    Dose adjusted to kidney functions.      Activity  as tolerated.    Monitor elevation of creatinine.    Seemed to be add is near baseline.    Tyler Garcia MD       Strong peripheral pulses

## 2022-03-16 ENCOUNTER — APPOINTMENT (OUTPATIENT)
Dept: ORTHOPEDIC SURGERY | Facility: CLINIC | Age: 52
End: 2022-03-16
Payer: COMMERCIAL

## 2022-03-16 VITALS
HEART RATE: 75 BPM | DIASTOLIC BLOOD PRESSURE: 78 MMHG | SYSTOLIC BLOOD PRESSURE: 127 MMHG | WEIGHT: 275 LBS | HEIGHT: 71 IN | BODY MASS INDEX: 38.5 KG/M2

## 2022-03-16 DIAGNOSIS — M25.561 PAIN IN RIGHT KNEE: ICD-10-CM

## 2022-03-16 PROCEDURE — 20611 DRAIN/INJ JOINT/BURSA W/US: CPT | Mod: RT

## 2022-03-16 PROCEDURE — 73564 X-RAY EXAM KNEE 4 OR MORE: CPT | Mod: 26,RT

## 2022-03-16 PROCEDURE — 99214 OFFICE O/P EST MOD 30 MIN: CPT | Mod: 25

## 2022-03-16 NOTE — PROCEDURE
[de-identified] : Using sterile technique, 2cc of depomedrol 40mg/ml, 4cc of 1% plain lidocaine, and 2 cc 0.25% marcaine was drawn up into a sterile syringe. The right knee joint space was identified using ultrasound. The right knee was then sterilely prepped with chlorhexidine. Ethyl chloride spray was used to anesthetize the skin and subQ tissue. The depomedrol/lidocaine/marcaine mixture was then injected into the knee joint in the superolateral position under ultrasound guidance and the needle position in the joint space was confirmed. The patient tolerated the procedure well without difficulty. The patient was given instructions on the use of ice and anti-inflammatories post injection site soreness.  Knee

## 2022-03-16 NOTE — DISCUSSION/SUMMARY
[de-identified] : Patient is a 51-year-old male with acute onset of right knee pain.  Although there are mild osteoarthritic changes on x-ray is physical exam elicits pain out of proportion to these findings.  For this reason I am sending him for an MRI to rule out medial meniscus tear of the right knee.  Conservative options were discussed.  He also received a Depo-Medrol injection using ultrasound guidance and sterile technique to the right knee.  He will ice and elevate at home.  Follow-up recommended after MRI.

## 2022-03-16 NOTE — PHYSICAL EXAM
[de-identified] : Multi body exam \par The patient appears well nourished and in no apparent distress. The patient is alert and oriented to person, place, and time. Affect and mood appear normal. The head is normocephalic and atraumatic. The eyes reveal normal sclera and extra ocular muscles are intact. The tongue is midline with no apparent lesions. Skin shows normal turgor with no evidence of eczema or psoriasis. No respiratory distress noted. Sensation grossly intact.\par   [de-identified] : Exam right knee: Skin is within normal limits there is a mild effusion.  Range of motion 0 to 150 degrees of flexion with pain at deep flexion.  There is medial joint line tenderness.  Positive Fabiana's. [de-identified] : X-ray 4 views right knee demonstrate very mild medial compartment osteoarthritis and mild patellofemoral osteoarthritis with osteophyte formation and joint space narrowing.

## 2022-03-16 NOTE — HISTORY OF PRESENT ILLNESS
[de-identified] : Patient is a 51-year-old male presenting for evaluation of 1 week history of right knee pain.  He denies any specific fall or trauma but states he woke up 1 day and excruciating pain in his right knee.  His knee pain is localized medially, anteriorly, and posteriorly.  He admits to swelling and stiffness.  He denies any fevers or chills.  His pain is worse with all weightbearing activity including walking distance and rising from a seated position.  The pain is also worse first thing in the morning.  He has been taking Tylenol and over-the-counter anti-inflammatories without significant improvement.  He has tried therapy and home exercise in the past without significant relief.  He has not had injections thus far.  He has not had an MRI.

## 2022-03-29 RX ORDER — CELECOXIB 200 MG/1
200 CAPSULE ORAL
Qty: 60 | Refills: 0 | Status: ACTIVE | COMMUNITY
Start: 2022-03-29 | End: 1900-01-01

## 2022-04-02 ENCOUNTER — APPOINTMENT (OUTPATIENT)
Dept: MRI IMAGING | Facility: CLINIC | Age: 52
End: 2022-04-02
Payer: COMMERCIAL

## 2022-04-02 ENCOUNTER — TRANSCRIPTION ENCOUNTER (OUTPATIENT)
Age: 52
End: 2022-04-02

## 2022-04-02 ENCOUNTER — OUTPATIENT (OUTPATIENT)
Dept: OUTPATIENT SERVICES | Facility: HOSPITAL | Age: 52
LOS: 1 days | End: 2022-04-02

## 2022-04-02 DIAGNOSIS — S83.8X1A SPRAIN OF OTHER SPECIFIED PARTS OF RIGHT KNEE, INITIAL ENCOUNTER: ICD-10-CM

## 2022-04-02 PROCEDURE — 73721 MRI JNT OF LWR EXTRE W/O DYE: CPT | Mod: 26,RT

## 2022-04-05 ENCOUNTER — APPOINTMENT (OUTPATIENT)
Dept: ORTHOPEDIC SURGERY | Facility: CLINIC | Age: 52
End: 2022-04-05
Payer: COMMERCIAL

## 2022-04-05 VITALS
HEART RATE: 66 BPM | DIASTOLIC BLOOD PRESSURE: 85 MMHG | SYSTOLIC BLOOD PRESSURE: 143 MMHG | HEIGHT: 71 IN | BODY MASS INDEX: 38.5 KG/M2 | WEIGHT: 275 LBS

## 2022-04-05 PROCEDURE — 99214 OFFICE O/P EST MOD 30 MIN: CPT

## 2022-04-05 NOTE — ADDENDUM
[FreeTextEntry1] : This note was authored by Vamshi Mcelroy working as a medical scribe for Dr. Ronan Calvo. The note was reviewed, edited, and revised by Dr. Ronan Calvo whom is in agreement with the exam findings, imaging findings, and treatment plan. 04/05/2022

## 2022-04-05 NOTE — PHYSICAL EXAM
[de-identified] : The patient appears well nourished  and in no apparent distress.  The patient is alert and oriented to person, place, and time.   Affect and mood appear normal. The head is normocephalic and atraumatic.  The eyes reveal normal sclera and extra ocular muscles are intact. The tongue is midline with no apparent lesions.  Skin shows normal turgor with no evidence of eczema or psoriasis.  No respiratory distress noted.  Sensation grossly intact.		  [de-identified] : Exam of the right knee shows a 5 degree flexion contracture. There is medial joint line tenderness. \par 5/5 motor strength bilaterally distally. Sensation intact distally.		  [de-identified] : EXAM: MRI KNEE RT  - ORDERED BY: RAMY TOBIAS\par PROCEDURE DATE:  04/02/2022\par IMPRESSION:\par 1.  Diminutive, blunted appearance of the medial meniscus posterior horn consistent with tear. Mild MCL sprain.In the medial compartment, there is moderate cartilage thinning diffusely present.\par \par 2.  Mild to moderate cartilage thinning in the patellofemoral lateral compartments as above.\par \par 3.  Moderate knee joint effusion.\par \par

## 2022-04-05 NOTE — HISTORY OF PRESENT ILLNESS
[de-identified] : Patient is a 51-year-old male presenting for evaluation of 3 weeks history of right knee pain. He denies any specific fall or trauma but states he woke up one day with excruciating pain in his right knee. His knee pain is localized medially, anteriorly, and posteriorly. He admits to swelling and stiffness. He denies any fevers or chills. His pain is worse with all weightbearing activity including walking distance and rising from a seated position. The pain is also worst first thing in the morning. He has been taking Tylenol and Advil without significant improvement. He has tried therapy and home exercise in the past without significant relief. The patient received a right knee cortisone injection at his last visit in office on March 16th 2022. He states this injection provided him with no relief at all. The patient presents today to discuss the results of their right knee MRI. \par

## 2022-04-05 NOTE — DISCUSSION/SUMMARY
[de-identified] : GARY HONEYCUTT is a 51 year male who presents with a right knee medial meniscus tear with mild to moderate medial and patellofemoral compartment arthritis.  Given the extent of arthritis I am hesitant to recommend arthroscopic surgery.  On the other hand I think knee replacement at this point would be premature.  I recommended exhausting nonoperative treatment options.  A series of hyaluronic acid gel injections was ordered for the patient and are pending insurance approval. The office will follow up with the patient when they become available. If gel injections are unsuccessful in relieving the patient's pain we will consider arthroscopic surgery.  Risks, benefits and alternatives to surgery were discussed and all questions were answered. Recovery from surgery was also discussed. A course of Celebrex was recommended. The patient was given a prescription for the Celebrex with directions. He was instructed to stop the medicine and call the office if there are any adverse reaction to the medicine. He was also instructed to consult with their primary care doctor prior to starting the medication.\par  \par

## 2022-04-22 ENCOUNTER — APPOINTMENT (OUTPATIENT)
Dept: ORTHOPEDIC SURGERY | Facility: CLINIC | Age: 52
End: 2022-04-22

## 2022-04-26 ENCOUNTER — APPOINTMENT (OUTPATIENT)
Dept: ORTHOPEDIC SURGERY | Facility: CLINIC | Age: 52
End: 2022-04-26
Payer: COMMERCIAL

## 2022-04-26 VITALS — BODY MASS INDEX: 38.5 KG/M2 | WEIGHT: 275 LBS | HEIGHT: 71 IN

## 2022-04-26 PROCEDURE — 20611 DRAIN/INJ JOINT/BURSA W/US: CPT | Mod: RT

## 2022-04-26 PROCEDURE — 99213 OFFICE O/P EST LOW 20 MIN: CPT | Mod: 25

## 2022-04-26 RX ORDER — AZITHROMYCIN 500 MG/1
500 TABLET, FILM COATED ORAL
Qty: 5 | Refills: 0 | Status: ACTIVE | COMMUNITY
Start: 2022-01-18

## 2022-04-26 RX ORDER — MELOXICAM 7.5 MG/1
7.5 TABLET ORAL
Qty: 42 | Refills: 1 | Status: DISCONTINUED | COMMUNITY
Start: 2021-04-15 | End: 2022-04-26

## 2022-04-26 RX ORDER — BENZONATATE 200 MG/1
200 CAPSULE ORAL
Qty: 30 | Refills: 0 | Status: ACTIVE | COMMUNITY
Start: 2022-01-18

## 2022-04-26 RX ORDER — METHYLPREDNISOLONE 4 MG/1
4 TABLET ORAL
Qty: 21 | Refills: 0 | Status: ACTIVE | COMMUNITY
Start: 2022-01-28

## 2022-04-26 RX ORDER — HYALURONATE SODIUM 20 MG/2 ML
20 SYRINGE (ML) INTRAARTICULAR
Qty: 1 | Refills: 0 | Status: DISCONTINUED | OUTPATIENT
Start: 2022-04-05 | End: 2022-04-26

## 2022-04-26 RX ORDER — DOXYCYCLINE HYCLATE 100 MG/1
100 TABLET ORAL
Qty: 14 | Refills: 0 | Status: ACTIVE | COMMUNITY
Start: 2022-01-28

## 2022-04-29 NOTE — DISCUSSION/SUMMARY
[de-identified] : GARY HONEYCUTT is a 51 year male who presents with a right knee medial meniscus tear with mild to moderate medial and patellofemoral compartment arthritis.  Patient received the first of 3 Euflexxa injections to the right knee using sterile technique and ultrasound guidance.  He tolerated well.  He will ice and elevate at home.  Follow-up recommended in 1 week

## 2022-04-29 NOTE — REASON FOR VISIT
[Follow-Up Visit] : a follow-up visit for [Other: ____] : [unfilled] [FreeTextEntry2] : Right knee Euflexxa inj#1 Lot# [id number], Exp 2023/04/25.

## 2022-04-29 NOTE — PROCEDURE
[de-identified] : Allergies: The patient denies allergies to medications and has no allergies to chicken,eggs, or feathers.\par Procedure: The patient has been identified by name and date of birth. Patient confirms that we are treating the right knee today.\par The knee was prepped in the usual sterile fashion. The areas were cleansed with chlorhexadine, then sprayed with ethyl chloride. The patient was then injected with the Euflexxa into the right knee in the anterolateral position. The patient tolerated the procedure well. The medication was delivered aseptically and atraumatically.\par Diagnosis: Osteoarthritis of the rightt knee\par Treatment: The patient was advised on the activities for today. I gave the patient instructions on postinjection ice and analgesia.\par

## 2022-04-29 NOTE — HISTORY OF PRESENT ILLNESS
[de-identified] : Patient is a 51-year-old male presenting for follow up evaluation  of right knee pain. He denies any specific fall or trauma but states he woke up one day with excruciating pain in his right knee. His knee pain is localized medially, anteriorly, and posteriorly. He admits to swelling and stiffness. He denies any fevers or chills. His pain is worse with all weightbearing activity including walking distance and rising from a seated position. The pain is also worst first thing in the morning. He has been taking Tylenol and Advil without significant improvement. He has tried therapy and home exercise in the past without significant relief. The patient received a right knee cortisone injection at his last visit in office on March 16th 2022. He states this injection provided him with no relief at all. He then went for MRI revealing a medial meniscus tear. He presents today for the first of three Euflexxa injections. \par

## 2022-04-29 NOTE — PHYSICAL EXAM
[de-identified] : The patient appears well nourished  and in no apparent distress.  The patient is alert and oriented to person, place, and time.   Affect and mood appear normal. The head is normocephalic and atraumatic.  The eyes reveal normal sclera and extra ocular muscles are intact. The tongue is midline with no apparent lesions.  Skin shows normal turgor with no evidence of eczema or psoriasis.  No respiratory distress noted.  Sensation grossly intact.		  [de-identified] : Exam of the right knee shows a 5 degree flexion contracture. There is medial joint line tenderness. \par 5/5 motor strength bilaterally distally. Sensation intact distally.		  [de-identified] : EXAM: MRI KNEE RT  - ORDERED BY: RAMY TOBIAS\par PROCEDURE DATE:  04/02/2022\par IMPRESSION:\par 1.  Diminutive, blunted appearance of the medial meniscus posterior horn consistent with tear. Mild MCL sprain.In the medial compartment, there is moderate cartilage thinning diffusely present.\par \par 2.  Mild to moderate cartilage thinning in the patellofemoral lateral compartments as above.\par \par 3.  Moderate knee joint effusion.\par \par

## 2022-05-04 ENCOUNTER — APPOINTMENT (OUTPATIENT)
Dept: ORTHOPEDIC SURGERY | Facility: CLINIC | Age: 52
End: 2022-05-04
Payer: COMMERCIAL

## 2022-05-04 VITALS
HEIGHT: 71 IN | HEART RATE: 70 BPM | WEIGHT: 275 LBS | SYSTOLIC BLOOD PRESSURE: 129 MMHG | DIASTOLIC BLOOD PRESSURE: 74 MMHG | BODY MASS INDEX: 38.5 KG/M2 | OXYGEN SATURATION: 96 %

## 2022-05-04 PROCEDURE — 20611 DRAIN/INJ JOINT/BURSA W/US: CPT | Mod: RT

## 2022-05-04 NOTE — REASON FOR VISIT
[Follow-Up Visit] : a follow-up visit for [Other: ____] : [unfilled] [FreeTextEntry2] : Right knee Euflexxa inj#2 Lot# [id number], Exp 2023/04/25.

## 2022-05-04 NOTE — HISTORY OF PRESENT ILLNESS
[de-identified] : Patient is here for the second Euflexxa injection for the right knee. \par Allergies: The patient denies allergies to medications and has no allergies to chicken,eggs, or feathers.\par Procedure: The patient has been identified by name and date of birth. Patient confirms that we are treating the right knee today.\par The knee was prepped in the usual sterile fashion. The knee joint space was identified using ultrasound. The area was cleansed with chlorhexadine, then sprayed with ethyl chloride. The patient was then injected with the Euflexxa into the right knee using ultrasound guidance  and the needle position in the superolateral joint space was confirmed. The patient tolerated the procedure well. The medication was delivered aseptically and atraumatically.\par Diagnosis: Osteoarthritis of the right knee. \par Treatment: The patient was advised on the activities for today. I gave the patient instructions on postinjection ice and analgesia.\par The patient will follow up in one week for their next injection to be administered.

## 2022-05-11 ENCOUNTER — APPOINTMENT (OUTPATIENT)
Dept: ORTHOPEDIC SURGERY | Facility: CLINIC | Age: 52
End: 2022-05-11
Payer: COMMERCIAL

## 2022-05-11 VITALS
HEART RATE: 80 BPM | DIASTOLIC BLOOD PRESSURE: 78 MMHG | HEIGHT: 71 IN | WEIGHT: 275 LBS | SYSTOLIC BLOOD PRESSURE: 132 MMHG | OXYGEN SATURATION: 97 % | BODY MASS INDEX: 38.5 KG/M2

## 2022-05-11 PROCEDURE — 20611 DRAIN/INJ JOINT/BURSA W/US: CPT | Mod: RT

## 2022-05-11 NOTE — HISTORY OF PRESENT ILLNESS
[de-identified] : Patient is here for the third Euflexxa injection for the right knee. \par Allergies: The patient denies allergies to medications and has no allergies to chicken,eggs, or feathers.\par Procedure: The patient has been identified by name and date of birth. Patient confirms that we are treating the right knee today.\par The knee was prepped in the usual sterile fashion. The knee joint space was identified using ultrasound. The area was cleansed with chlorhexadine, then sprayed with ethyl chloride. The patient was then injected with the Euflexxa into the right knee using ultrasound guidance  and the needle position in the superolateral joint space was confirmed. The patient tolerated the procedure well. The medication was delivered aseptically and atraumatically.\par Diagnosis: Osteoarthritis of the right knee. \par Treatment: The patient was advised on the activities for today. I gave the patient instructions on postinjection ice and analgesia.\par The patient will follow up in 6-8 weeks.

## 2022-05-12 NOTE — ED ADULT TRIAGE NOTE - TEMPERATURE IN FAHRENHEIT (DEGREES F)
Patient is medically optimized for the planned surgery based on this exam, today's EKG and recent labs in chart.  Medication instructions:  1)  No aspirin, Motrin, Aleve, ibuprofen, Advil or other NSAIDs (nonsteroidal anti-inflammatory drugs) for the week prior to surgery.  2)  On the morning of surgery take no medications  CC:  Dr. Perla      
97.8

## 2022-05-24 ENCOUNTER — RX RENEWAL (OUTPATIENT)
Age: 52
End: 2022-05-24

## 2022-05-24 RX ORDER — CELECOXIB 200 MG/1
200 CAPSULE ORAL
Qty: 42 | Refills: 1 | Status: ACTIVE | COMMUNITY
Start: 2022-05-04 | End: 1900-01-01

## 2022-06-08 ENCOUNTER — EMERGENCY (EMERGENCY)
Facility: HOSPITAL | Age: 52
LOS: 1 days | Discharge: HOME | End: 2022-06-08
Attending: EMERGENCY MEDICINE
Payer: COMMERCIAL

## 2022-06-08 VITALS
HEART RATE: 75 BPM | DIASTOLIC BLOOD PRESSURE: 91 MMHG | TEMPERATURE: 98 F | OXYGEN SATURATION: 95 % | HEIGHT: 71 IN | WEIGHT: 270.07 LBS | RESPIRATION RATE: 20 BRPM | SYSTOLIC BLOOD PRESSURE: 154 MMHG

## 2022-06-08 PROCEDURE — 99283 EMERGENCY DEPT VISIT LOW MDM: CPT

## 2022-06-08 RX ORDER — IBUPROFEN 200 MG
1 TABLET ORAL
Qty: 12 | Refills: 0
Start: 2022-06-08 | End: 2022-06-10

## 2022-06-08 RX ORDER — DIAZEPAM 5 MG
5 TABLET ORAL ONCE
Refills: 0 | Status: DISCONTINUED | OUTPATIENT
Start: 2022-06-08 | End: 2022-06-08

## 2022-06-08 RX ORDER — OXYCODONE HYDROCHLORIDE 5 MG/1
1 TABLET ORAL
Qty: 4 | Refills: 0
Start: 2022-06-08 | End: 2022-06-08

## 2022-06-08 RX ORDER — KETOROLAC TROMETHAMINE 30 MG/ML
30 SYRINGE (ML) INJECTION ONCE
Refills: 0 | Status: DISCONTINUED | OUTPATIENT
Start: 2022-06-08 | End: 2022-06-08

## 2022-06-08 RX ORDER — DEXAMETHASONE 0.5 MG/5ML
8 ELIXIR ORAL ONCE
Refills: 0 | Status: COMPLETED | OUTPATIENT
Start: 2022-06-08 | End: 2022-06-08

## 2022-06-08 RX ORDER — CYCLOBENZAPRINE HYDROCHLORIDE 10 MG/1
1 TABLET, FILM COATED ORAL
Qty: 9 | Refills: 0
Start: 2022-06-08 | End: 2022-06-10

## 2022-06-08 RX ADMIN — Medication 8 MILLIGRAM(S): at 11:39

## 2022-06-08 RX ADMIN — Medication 5 MILLIGRAM(S): at 11:39

## 2022-06-08 NOTE — ED ADULT NURSE NOTE - OBJECTIVE STATEMENT
Pt c/o flare up of lower back pain. Pt reports being unable to secure appointment with pain management. Pt able to bear weight, ambulate, and move freely. Pt medicated as prescribed; will continue to monitor.

## 2022-06-08 NOTE — ED PROVIDER NOTE - OBJECTIVE STATEMENT
51 y/o M c/o left sided back pain radiating down left leg, 7/10 in severity, worse with movement.  Pt has been having intermittent back pain since he sustained an injury in 2012.  Denies recent trauma, saddle anesthesia, bowel/bladder incontinence, focal weaknesses.

## 2022-06-08 NOTE — ED PROVIDER NOTE - ATTENDING APP SHARED VISIT CONTRIBUTION OF CARE
Patient seen with PA.  Here with back pain.  no s/sx of cord compression.  Has follow up. Exam without neuro deficits.  no trauma.  Non toxic.  Well appearing. Uneventful ED observation period. Discussed signs and symptoms and reasons for return with good teachback.

## 2022-06-08 NOTE — ED PROVIDER NOTE - PATIENT PORTAL LINK FT
You can access the FollowMyHealth Patient Portal offered by [facility] by registering at the following website: [email or web address]. By joining [facility]’s FollowMyHealth portal, you will also be able to view your health information using other applications (apps) compatible with our system.

## 2022-06-08 NOTE — ED ADULT NURSE NOTE - CHIEF COMPLAINT QUOTE
Pt BIBA c/o back spasms.  Pt arrive with 18g in LAC.  Pt received 100mcg fentany prior to arrival. Hx herniated discs.

## 2022-06-08 NOTE — ED PROVIDER NOTE - NS ED ATTENDING STATEMENT MOD
This was a shared visit with the JESSIE. I reviewed and verified the documentation and independently performed the documented:

## 2022-06-08 NOTE — ED PROVIDER NOTE - CARE PROVIDER_API CALL
Frances Fraser)  Anesthesiology; Pain Medicine  [address] [place] [address], [place] [address]  Phone: [phone]  Fax: [phone]  Follow Up Time:

## 2022-06-14 ENCOUNTER — EMERGENCY (EMERGENCY)
Facility: HOSPITAL | Age: 52
LOS: 1 days | Discharge: HOME | End: 2022-06-14
Attending: EMERGENCY MEDICINE
Payer: COMMERCIAL

## 2022-06-14 VITALS
HEIGHT: 71 IN | RESPIRATION RATE: 18 BRPM | WEIGHT: 250 LBS | OXYGEN SATURATION: 98 % | HEART RATE: 90 BPM | TEMPERATURE: 98 F | SYSTOLIC BLOOD PRESSURE: 163 MMHG | DIASTOLIC BLOOD PRESSURE: 95 MMHG

## 2022-06-14 PROCEDURE — 99284 EMERGENCY DEPT VISIT MOD MDM: CPT

## 2022-06-14 RX ORDER — DIAZEPAM 5 MG
5 TABLET ORAL ONCE
Refills: 0 | Status: DISCONTINUED | OUTPATIENT
Start: 2022-06-14 | End: 2022-06-14

## 2022-06-14 RX ORDER — DEXAMETHASONE 0.5 MG/5ML
6 ELIXIR ORAL ONCE
Refills: 0 | Status: COMPLETED | OUTPATIENT
Start: 2022-06-14 | End: 2022-06-14

## 2022-06-14 RX ORDER — LIDOCAINE 4 G/100G
1 CREAM TOPICAL ONCE
Refills: 0 | Status: COMPLETED | OUTPATIENT
Start: 2022-06-14 | End: 2022-06-14

## 2022-06-14 RX ORDER — HYDROMORPHONE HYDROCHLORIDE 2 MG/ML
1 INJECTION INTRAMUSCULAR; INTRAVENOUS; SUBCUTANEOUS ONCE
Refills: 0 | Status: DISCONTINUED | OUTPATIENT
Start: 2022-06-14 | End: 2022-06-14

## 2022-06-14 RX ORDER — OXYCODONE AND ACETAMINOPHEN 5; 325 MG/1; MG/1
1 TABLET ORAL ONCE
Refills: 0 | Status: DISCONTINUED | OUTPATIENT
Start: 2022-06-14 | End: 2022-06-14

## 2022-06-14 RX ADMIN — Medication 6 MILLIGRAM(S): at 20:51

## 2022-06-14 RX ADMIN — HYDROMORPHONE HYDROCHLORIDE 1 MILLIGRAM(S): 2 INJECTION INTRAMUSCULAR; INTRAVENOUS; SUBCUTANEOUS at 22:06

## 2022-06-14 RX ADMIN — HYDROMORPHONE HYDROCHLORIDE 1 MILLIGRAM(S): 2 INJECTION INTRAMUSCULAR; INTRAVENOUS; SUBCUTANEOUS at 23:00

## 2022-06-14 RX ADMIN — LIDOCAINE 1 PATCH: 4 CREAM TOPICAL at 20:51

## 2022-06-14 RX ADMIN — Medication 5 MILLIGRAM(S): at 20:50

## 2022-06-14 RX ADMIN — OXYCODONE AND ACETAMINOPHEN 1 TABLET(S): 5; 325 TABLET ORAL at 20:50

## 2022-06-14 RX ADMIN — OXYCODONE AND ACETAMINOPHEN 1 TABLET(S): 5; 325 TABLET ORAL at 21:20

## 2022-06-14 NOTE — ED PROVIDER NOTE - PROGRESS NOTE DETAILS
pt reports improvement of pain after medication   no neuro deficits  pt has appt with spine doctor tomorrow he needs to follow up with

## 2022-06-14 NOTE — ED PROVIDER NOTE - NSFOLLOWUPCLINICS_GEN_ALL_ED_FT
[facility]  Ortho/Spine  [address] [place] [address]  Phone: [phone]  Fax:

## 2022-06-14 NOTE — ED PROVIDER NOTE - OBJECTIVE STATEMENT
pt is a 51 y/o male with a pmhx of back pain hx of herniated disc in back presenting to the ed for evaluation. marita rodríguez was seen at [facility] on june 8th for back pain. pt states pain improved over the weekend with pain medication, but in the past day has worsened again. pt denies injuries or trauma to the area. pt feels that his back is in a spasm, states feels it on the left lower side of his back. pt denies cp sob fever nausea vomiting numbness or loss of sensation urinary or fecal incontinence rashes abrasions or lacerations

## 2022-06-14 NOTE — ED PROVIDER NOTE - NSFOLLOWUPINSTRUCTIONS_ED_ALL_ED_FT
please follow up with spine appointment tomorrow  take medication as directed  return to the emergency department for any worsening symptoms

## 2022-06-14 NOTE — ED PROVIDER NOTE - ATTENDING APP SHARED VISIT CONTRIBUTION OF CARE
exacerbation of chronic back pain  feels like spasms  pe as doc  improved with meds  has appt with MD tomorrow   agree w care

## 2022-06-14 NOTE — ED PROVIDER NOTE - PATIENT PORTAL LINK FT
You can access the FollowMyHealth Patient Portal offered by [facility] by registering at the following website: [email or web address]. By joining [facility]’s FollowMyHealth portal, you will also be able to view your health information using other applications (apps) compatible with our system.

## 2022-06-14 NOTE — ED PROVIDER NOTE - PHYSICAL EXAMINATION
Const: Awake, alert and oriented. In no acute distress. Well appearing.  HEENT: NC/AT. Moist mucous membranes.  Eyes: No scleral icterus. EOMI.  Neck:. Soft and supple. Full ROM without pain.  Cardiac:  +S1/S2. No murmurs. Peripheral pulses 2+ and symmetric. No LE edema.  Resp: Speaking in full sentences. No evidence of respiratory distress. No wheezes, rales or rhonchi.  Abd: Soft, non-tender, non-distended. Normal bowel sounds in all 4 quadrants. No guarding or rebound.  Back: Spine midline and non-tender. No CVAT.  MSK: Spasm noted on left paraspinal lumbar on palpation, no midline tenderness of spine no bony step offs or deformities felt on palpation  Skin: No rashes, abrasions or lacerations.  Lymph: No cervical lymphadenopathy.  Neuro: Awake, alert & oriented x 3. CN II-XII intact finger to nose intact neurovasculary intact muscle strength fair gait without ataxia reflexes intact

## 2022-06-14 NOTE — ED ADULT TRIAGE NOTE - CHIEF COMPLAINT QUOTE
pt comes in for back spasm started to get worse today, pt  could not wait for next doctor appointment, denies any distress, no past medical hx

## 2022-06-14 NOTE — ED ADULT NURSE NOTE - OBJECTIVE STATEMENT
Patient states that he has bulging discs had been seen here last week and given 2 days worth of pain meds. States he was unable to get follow up appointment now having pain with spasms to back

## 2022-06-15 PROCEDURE — 99283 EMERGENCY DEPT VISIT LOW MDM: CPT | Mod: 25

## 2022-06-15 PROCEDURE — 96372 THER/PROPH/DIAG INJ SC/IM: CPT

## 2022-06-15 RX ADMIN — Medication 5 MILLIGRAM(S): at 00:38

## 2022-07-01 ENCOUNTER — APPOINTMENT (OUTPATIENT)
Dept: ORTHOPEDIC SURGERY | Facility: CLINIC | Age: 52
End: 2022-07-01

## 2022-07-06 ENCOUNTER — APPOINTMENT (OUTPATIENT)
Dept: ORTHOPEDIC SURGERY | Facility: CLINIC | Age: 52
End: 2022-07-06

## 2022-07-06 VITALS
HEART RATE: 96 BPM | HEIGHT: 71 IN | WEIGHT: 275 LBS | OXYGEN SATURATION: 98 % | BODY MASS INDEX: 38.5 KG/M2 | SYSTOLIC BLOOD PRESSURE: 120 MMHG | DIASTOLIC BLOOD PRESSURE: 72 MMHG

## 2022-07-06 PROCEDURE — 99213 OFFICE O/P EST LOW 20 MIN: CPT

## 2022-07-06 NOTE — PHYSICAL EXAM
[de-identified] : The patient appears well nourished  and in no apparent distress.  The patient is alert and oriented to person, place, and time.   Affect and mood appear normal. The head is normocephalic and atraumatic.  The eyes reveal normal sclera and extra ocular muscles are intact. The tongue is midline with no apparent lesions.  Skin shows normal turgor with no evidence of eczema or psoriasis.  No respiratory distress noted.  Sensation grossly intact.		  [de-identified] : Exam of the right knee shows a 5 degree flexion contracture. There is medial joint line tenderness. \par 5/5 motor strength bilaterally distally. Sensation intact distally.		  [de-identified] : EXAM: MRI KNEE RT  - ORDERED BY: RAMY TOBIAS\par PROCEDURE DATE:  04/02/2022\par IMPRESSION:\par 1.  Diminutive, blunted appearance of the medial meniscus posterior horn consistent with tear. Mild MCL sprain.In the medial compartment, there is moderate cartilage thinning diffusely present.\par \par 2.  Mild to moderate cartilage thinning in the patellofemoral lateral compartments as above.\par \par 3.  Moderate knee joint effusion.\par \par

## 2022-07-06 NOTE — HISTORY OF PRESENT ILLNESS
[de-identified] : Patient is a 51-year-old male presenting for follow up evaluation  of right knee osteoarthritis.  Patient recently completed Euflexxa injections to the right knee on 5/11/2022.  He reports significant improvement from these injections and is doing much better at this time.  He still has intermittent pain with weight-bear activity, however he states this is more mild than it had been before.  He is not currently in physical therapy but he is active with home exercise.  He takes Celebrex as needed.  Overall he is doing better.

## 2022-07-06 NOTE — DISCUSSION/SUMMARY
[de-identified] : GARY HONEYCUTT is a 52 year male who presents with a right knee medial meniscus tear with mild to moderate medial and patellofemoral compartment arthritis.  Patient recently completed Euflexxa injections and is so far doing much better.  He declines need for physical therapy prescription at this time.  He declines need for refill of Celebrex at this time.  He will follow-up as needed.

## 2022-09-29 NOTE — ED ADULT NURSE NOTE - CHIEF COMPLAINT
Authorization number shared with "[employer].  Lab order placed.   Patient notified via [employer] Portal message.    The patient is a 49y Male complaining of headache.

## 2022-10-10 RX ORDER — NAPROXEN 500 MG/1
500 TABLET ORAL
Qty: 28 | Refills: 0 | Status: ACTIVE | COMMUNITY
Start: 2022-10-10 | End: 1900-01-01

## 2022-10-11 ENCOUNTER — APPOINTMENT (OUTPATIENT)
Dept: ORTHOPEDIC SURGERY | Facility: CLINIC | Age: 52
End: 2022-10-11

## 2022-10-11 VITALS
WEIGHT: 275 LBS | DIASTOLIC BLOOD PRESSURE: 79 MMHG | HEART RATE: 73 BPM | OXYGEN SATURATION: 98 % | HEIGHT: 71 IN | BODY MASS INDEX: 38.5 KG/M2 | SYSTOLIC BLOOD PRESSURE: 125 MMHG

## 2022-10-11 DIAGNOSIS — S83.8X1A SPRAIN OF OTHER SPECIFIED PARTS OF RIGHT KNEE, INITIAL ENCOUNTER: ICD-10-CM

## 2022-10-11 PROCEDURE — 20611 DRAIN/INJ JOINT/BURSA W/US: CPT | Mod: RT

## 2022-10-11 PROCEDURE — 76942 ECHO GUIDE FOR BIOPSY: CPT | Mod: 59

## 2022-10-11 PROCEDURE — 99214 OFFICE O/P EST MOD 30 MIN: CPT | Mod: 25

## 2022-10-11 PROCEDURE — 73564 X-RAY EXAM KNEE 4 OR MORE: CPT | Mod: RT

## 2022-10-11 RX ORDER — HYALURONATE SODIUM 20 MG/2 ML
20 SYRINGE (ML) INTRAARTICULAR
Qty: 1 | Refills: 0 | Status: ACTIVE | OUTPATIENT
Start: 2022-10-11

## 2022-10-11 RX ORDER — MELOXICAM 7.5 MG/1
7.5 TABLET ORAL
Qty: 60 | Refills: 0 | Status: ACTIVE | COMMUNITY
Start: 2022-10-11 | End: 1900-01-01

## 2022-10-11 NOTE — PROCEDURE
[de-identified] : Using sterile technique, 2cc of depomedrol 40mg/ml, 4cc of 1% plain lidocaine, and 2 cc 0.25% marcaine was drawn up into a sterile syringe. The right knee joint space was identified using ultrasound. The right knee was then sterilely prepped with chlorhexidine. Ethyl chloride spray was used to anesthetize the skin and subQ tissue. The depomedrol/lidocaine/marcaine mixture was then injected into the knee joint in the superolateral position under ultrasound guidance and the needle position in the joint space was confirmed. The patient tolerated the procedure well without difficulty. The patient was given instructions on the use of ice and anti-inflammatories post injection site soreness.

## 2022-10-11 NOTE — DISCUSSION/SUMMARY
[de-identified] : GARY HONEYCUTT is a 51 year male who presents with a right knee medial meniscus tear with moderate medial and patellofemoral compartment arthritis.  COnservative options were discussed. He received a steroid injection to the right knee using ultrasound guidance and tolerated well. I recommended a course of Mobic. The patient was given a prescription for the Mobic with directions. The patient was instructed to stop the medicine and call the office if there are any adverse reaction to the medicine. The patient was also instructed to consult with their primary care doctor prior to starting the medication. Euflexxa injections ordered. Follow-up once injections are approved.  Consent: The patient's consent was obtained including but not limited to risks of crusting, scabbing, blistering, scarring, darker or lighter pigmentary change, recurrence, incomplete removal and infection. RTC in 2 months if lesion(s) persistent. Number Of Freeze-Thaw Cycles: 2 freeze-thaw cycles Duration Of Freeze Thaw-Cycle (Seconds): 10 Render Post-Care Instructions In Note?: yes Detail Level: Detailed Post-Care Instructions: I reviewed with the patient in detail post-care instructions. Patient is to wear sunprotection, and avoid picking at any of the treated lesions. Pt may apply Vaseline to crusted or scabbing areas.

## 2022-10-11 NOTE — PHYSICAL EXAM
[de-identified] : The patient appears well nourished  and in no apparent distress.  The patient is alert and oriented to person, place, and time.   Affect and mood appear normal. The head is normocephalic and atraumatic.  The eyes reveal normal sclera and extra ocular muscles are intact. The tongue is midline with no apparent lesions.  Skin shows normal turgor with no evidence of eczema or psoriasis.  No respiratory distress noted.  Sensation grossly intact.		  [de-identified] : Exam of the right knee shows a 5 degree flexion contracture. There is medial joint line tenderness. \par 5/5 motor strength bilaterally distally. Sensation intact distally.		  [de-identified] : Xray 4 views right knee demonstrate moderate patellofemoral and medial osteoarthritis. \par \par EXAM: MRI KNEE RT  - ORDERED BY: RAMY TOBIAS\par PROCEDURE DATE:  04/02/2022\par IMPRESSION:\par 1.  Diminutive, blunted appearance of the medial meniscus posterior horn consistent with tear. Mild MCL sprain.In the medial compartment, there is moderate cartilage thinning diffusely present.\par \par 2.  Mild to moderate cartilage thinning in the patellofemoral lateral compartments as above.\par \par 3.  Moderate knee joint effusion.\par \par

## 2022-10-19 ENCOUNTER — APPOINTMENT (OUTPATIENT)
Dept: ORTHOPEDIC SURGERY | Facility: CLINIC | Age: 52
End: 2022-10-19

## 2022-10-19 VITALS — DIASTOLIC BLOOD PRESSURE: 79 MMHG | SYSTOLIC BLOOD PRESSURE: 142 MMHG | HEART RATE: 69 BPM

## 2022-10-19 PROCEDURE — 20611 DRAIN/INJ JOINT/BURSA W/US: CPT | Mod: RT

## 2022-10-19 PROCEDURE — 99213 OFFICE O/P EST LOW 20 MIN: CPT | Mod: 25

## 2022-10-19 NOTE — PHYSICAL EXAM
[de-identified] : The patient appears well nourished  and in no apparent distress.  The patient is alert and oriented to person, place, and time.   Affect and mood appear normal. The head is normocephalic and atraumatic.  The eyes reveal normal sclera and extra ocular muscles are intact. The tongue is midline with no apparent lesions.  Skin shows normal turgor with no evidence of eczema or psoriasis.  No respiratory distress noted.  Sensation grossly intact.		  [de-identified] : Exam of the right knee shows a 5 degree flexion contracture. There is medial joint line tenderness. \par 5/5 motor strength bilaterally distally. Sensation intact distally.		  [de-identified] : Xray 4 views right knee demonstrate moderate patellofemoral and medial osteoarthritis. \par \par EXAM: MRI KNEE RT  - ORDERED BY: RAMY TOBIAS\par PROCEDURE DATE:  04/02/2022\par IMPRESSION:\par 1.  Diminutive, blunted appearance of the medial meniscus posterior horn consistent with tear. Mild MCL sprain.In the medial compartment, there is moderate cartilage thinning diffusely present.\par \par 2.  Mild to moderate cartilage thinning in the patellofemoral lateral compartments as above.\par \par 3.  Moderate knee joint effusion.\par \par

## 2022-10-19 NOTE — REASON FOR VISIT
[Follow-Up Visit] : a follow-up visit for [Other: ____] : [unfilled] [FreeTextEntry2] : R knee Euflexxa #1 (LOT#[id number], EXP 09/26/2023)

## 2022-10-19 NOTE — HISTORY OF PRESENT ILLNESS
[Stable] : stable [de-identified] : Patient is a 51-year-old male presenting for follow up evaluation  of right knee pain. He denies any specific fall or trauma but states about one week ago his pain became much worse. His knee pain is localized medially, anteriorly, and posteriorly. He admits to swelling and stiffness. He denies any fevers or chills. His pain is worse with all weightbearing activity including walking distance and rising from a seated position. The pain is also worst first thing in the morning. He has been taking Tylenol and Advil without significant improvement. He has tried therapy and home exercise in the past without significant relief. The patient previously had a steroid injection on March 16th 2022. He presents today for the first Euflexxa injection to the right knee.

## 2022-10-19 NOTE — DISCUSSION/SUMMARY
[de-identified] : GARY HONEYCUTT is a 51 year male who presents with a right knee medial meniscus tear with moderate medial and patellofemoral compartment arthritis.  Patient received the first of three gel injections to the Right knee using sterile technique. He tolerated well. He will ice and elevate when home. Follow up in 1 week for next injection.

## 2022-10-19 NOTE — PROCEDURE
[de-identified] : Allergies: The patient denies allergies to medications and has no allergies to chicken,eggs, or feathers.\par Procedure: The patient has been identified by name and date of birth. Patient confirms that we are treating the right knee today.\par The knee was prepped in the usual sterile fashion. The knee joint space was identified using ultrasound. The area was cleansed with chlorhexadine, then sprayed with ethyl chloride. The patient was then injected with the Euflexxa into the right knee using ultrasound guidance  and the needle position in the superolateral joint space was confirmed. The patient tolerated the procedure well. The medication was delivered aseptically and atraumatically.\par Diagnosis: Osteoarthritis of the right knee. \par Treatment: The patient was advised on the activities for today. I gave the patient instructions on postinjection ice and analgesia.\par

## 2022-10-26 ENCOUNTER — APPOINTMENT (OUTPATIENT)
Dept: ORTHOPEDIC SURGERY | Facility: CLINIC | Age: 52
End: 2022-10-26

## 2022-10-26 VITALS — HEART RATE: 66 BPM | SYSTOLIC BLOOD PRESSURE: 122 MMHG | DIASTOLIC BLOOD PRESSURE: 79 MMHG

## 2022-10-26 PROCEDURE — 20611 DRAIN/INJ JOINT/BURSA W/US: CPT | Mod: RT

## 2022-10-26 NOTE — HISTORY OF PRESENT ILLNESS
[de-identified] : Patient is here for the second Euflexxa injection for the right knee. \par Allergies: The patient denies allergies to medications and has no allergies to chicken,eggs, or feathers.\par Procedure: The patient has been identified by name and date of birth. Patient confirms that we are treating the right knee today.\par The knee was prepped in the usual sterile fashion. The knee joint space was identified using ultrasound. The area was cleansed with chlorhexadine, then sprayed with ethyl chloride. The patient was then injected with the Euflexxa into the right knee using ultrasound guidance  and the needle position in the superolateral joint space was confirmed. The patient tolerated the procedure well. The medication was delivered aseptically and atraumatically.\par Diagnosis: Osteoarthritis of the right knee. \par Treatment: The patient was advised on the activities for today. I gave the patient instructions on postinjection ice and analgesia.\par The patient will follow up in one week for their next injection to be administered.

## 2022-10-26 NOTE — REASON FOR VISIT
[Follow-Up Visit] : a follow-up visit for [Other: ____] : [unfilled] [FreeTextEntry2] : R knee Euflexxa #2 (LOT#[id number], EXP 09/26/2023)

## 2022-11-02 ENCOUNTER — APPOINTMENT (OUTPATIENT)
Dept: ORTHOPEDIC SURGERY | Facility: CLINIC | Age: 52
End: 2022-11-02

## 2022-11-02 VITALS — DIASTOLIC BLOOD PRESSURE: 80 MMHG | HEART RATE: 69 BPM | SYSTOLIC BLOOD PRESSURE: 145 MMHG

## 2022-11-02 PROCEDURE — 20611 DRAIN/INJ JOINT/BURSA W/US: CPT | Mod: RT

## 2022-11-02 NOTE — REASON FOR VISIT
[Follow-Up Visit] : a follow-up visit for [Other: ____] : [unfilled] [FreeTextEntry2] : R knee Euflexxa #3 (LOT#[id number], EXP 09/26/2023)

## 2022-11-02 NOTE — HISTORY OF PRESENT ILLNESS
[de-identified] : Patient is here for the third Euflexxa injection for the right knee. \par Allergies: The patient denies allergies to medications and has no allergies to chicken,eggs, or feathers.\par Procedure: The patient has been identified by name and date of birth. Patient confirms that we are treating the right knee today.\par The knee was prepped in the usual sterile fashion. The knee joint space was identified using ultrasound. The area was cleansed with chlorhexadine, then sprayed with ethyl chloride. The patient was then injected with the Euflexxa into the right knee using ultrasound guidance  and the needle position in the superolateral joint space was confirmed. The patient tolerated the procedure well. The medication was delivered aseptically and atraumatically.\par Diagnosis: Osteoarthritis of the right knee. \par Treatment: The patient was advised on the activities for today. I gave the patient instructions on postinjection ice and analgesia.\par The patient will follow up in 6-8 weeks

## 2022-12-14 ENCOUNTER — APPOINTMENT (OUTPATIENT)
Dept: ORTHOPEDIC SURGERY | Facility: CLINIC | Age: 52
End: 2022-12-14

## 2022-12-14 VITALS — DIASTOLIC BLOOD PRESSURE: 80 MMHG | SYSTOLIC BLOOD PRESSURE: 158 MMHG | HEART RATE: 91 BPM

## 2022-12-14 PROCEDURE — 73564 X-RAY EXAM KNEE 4 OR MORE: CPT | Mod: 26,LT

## 2022-12-14 PROCEDURE — 99214 OFFICE O/P EST MOD 30 MIN: CPT | Mod: 25

## 2022-12-14 PROCEDURE — 20611 DRAIN/INJ JOINT/BURSA W/US: CPT | Mod: LT

## 2022-12-14 NOTE — PROCEDURE
[de-identified] : Using sterile technique, 2cc of depomedrol 40mg/ml, 4cc of 1% plain lidocaine, and 2 cc 0.25% marcaine was drawn up into a sterile syringe. The left knee joint space was identified using ultrasound. The left knee was then sterilely prepped with chlorhexidine. Ethyl chloride spray was used to anesthetize the skin and subQ tissue. The depomedrol/lidocaine/marcaine mixture was then injected into the knee joint in the superolateral position under ultrasound guidance and the needle position in the joint space was confirmed. The patient tolerated the procedure well without difficulty. The patient was given instructions on the use of ice and anti-inflammatories post injection site soreness.

## 2022-12-14 NOTE — DISCUSSION/SUMMARY
[de-identified] : Patient is a 52-year-old male with bilateral knee osteoarthritis.  His right knee is currently doing much better following recent viscosupplementation.  For his left knee he received a ultrasound-guided steroid injection today and tolerated well.  He will ice and elevate at home.  Viscosupplementation was ordered for the left knee as this is worked well for him in the past.  He declines PT prescription at this time.  He will continue with over-the-counter anti-inflammatories as needed.  Follow-up recommended once gel injections are approved and available.

## 2022-12-14 NOTE — PHYSICAL EXAM
[de-identified] : Multi body exam \par The patient appears well nourished and in no apparent distress. The patient is alert and oriented to person, place, and time. Affect and mood appear normal. The head is normocephalic and atraumatic. The eyes reveal normal sclera and extra ocular muscles are intact. The tongue is midline with no apparent lesions. Skin shows normal turgor with no evidence of eczema or psoriasis. No respiratory distress noted. Sensation grossly intact.\par   [de-identified] : Exam left knee: Skin is within normal limits there is no effusion.  Range of motion 0 to 105 degrees of flexion with pain at deep flexion.  There is no medial or lateral joint line tenderness. [de-identified] : X-ray 4 views left knee demonstrate bone-on-bone medial compartment and moderate to advanced patellofemoral compartment osteoarthritis.

## 2022-12-15 RX ORDER — HYALURONATE SODIUM 20 MG/2 ML
20 SYRINGE (ML) INTRAARTICULAR
Qty: 1 | Refills: 0 | Status: ACTIVE | OUTPATIENT
Start: 2022-12-14

## 2023-01-11 ENCOUNTER — APPOINTMENT (OUTPATIENT)
Dept: ORTHOPEDIC SURGERY | Facility: CLINIC | Age: 53
End: 2023-01-11
Payer: COMMERCIAL

## 2023-01-11 VITALS — HEART RATE: 71 BPM | SYSTOLIC BLOOD PRESSURE: 133 MMHG | DIASTOLIC BLOOD PRESSURE: 76 MMHG

## 2023-01-11 PROCEDURE — 99213 OFFICE O/P EST LOW 20 MIN: CPT | Mod: 25

## 2023-01-11 PROCEDURE — 20611 DRAIN/INJ JOINT/BURSA W/US: CPT | Mod: LT

## 2023-01-12 NOTE — HISTORY OF PRESENT ILLNESS
[de-identified] : Mr. GARY HONEYCUTT is a 52 year old male presenting for follow up evaluation. He recently completed Euflexxa injections to the right knee on 11/2/22. His right knee is doing much better at this time. Patient now is having worsening left knee pain over the past few days.  Left knee pain is localized medially and is worse with all weightbearing activity including walking any distance and using the stairs.  Patient denies any falls or trauma.  He has a history of left knee osteoarthritis for which he received Euflexxa injections in June 2021.  He reports these injections worked very well up until recently.  He tried physical therapy without relief.  He takes anti-inflammatories without significant improvement. He presents for the first gel injection to the left knee.

## 2023-01-12 NOTE — REASON FOR VISIT
[Follow-Up Visit] : a follow-up visit for [FreeTextEntry2] : Left knee Euflexxa #1 [LOT #[id number], EXP 2023-11-26]

## 2023-01-12 NOTE — PROCEDURE
[de-identified] : Allergies: The patient denies allergies to medications and has no allergies to chicken,eggs, or feathers.\par Procedure: The patient has been identified by name and date of birth. Patient confirms that we are treating the left knee today.\par The knee was prepped in the usual sterile fashion. The knee joint space was identified using ultrasound. The area was cleansed with chlorhexadine, then sprayed with ethyl chloride. The patient was then injected with the Euflexxa into the left knee using ultrasound guidance  and the needle position in the superolateral joint space was confirmed. The patient tolerated the procedure well. The medication was delivered aseptically and atraumatically.\par Diagnosis: Osteoarthritis of the left knee. \par Treatment: The patient was advised on the activities for today. I gave the patient instructions on postinjection ice and analgesia.\par

## 2023-01-12 NOTE — PHYSICAL EXAM
[de-identified] : Multi body exam \par The patient appears well nourished and in no apparent distress. The patient is alert and oriented to person, place, and time. Affect and mood appear normal. The head is normocephalic and atraumatic. The eyes reveal normal sclera and extra ocular muscles are intact. The tongue is midline with no apparent lesions. Skin shows normal turgor with no evidence of eczema or psoriasis. No respiratory distress noted. Sensation grossly intact.\par   [de-identified] : Exam left knee: Skin is within normal limits there is no effusion.  Range of motion 0 to 105 degrees of flexion with pain at deep flexion.  There is no medial or lateral joint line tenderness. [de-identified] : Prior X-ray 4 views left knee demonstrate bone-on-bone medial compartment and moderate to advanced patellofemoral compartment osteoarthritis.

## 2023-01-12 NOTE — DISCUSSION/SUMMARY
[de-identified] : Patient is a 52-year-old male with bilateral knee osteoarthritis.  His right knee is currently doing much better following recent viscosupplementation.  For his left knee he received the first gel injection under ultrasound guidance. He tolerated well. Follow up in one week.

## 2023-01-18 ENCOUNTER — APPOINTMENT (OUTPATIENT)
Dept: ORTHOPEDIC SURGERY | Facility: CLINIC | Age: 53
End: 2023-01-18
Payer: COMMERCIAL

## 2023-01-18 VITALS
DIASTOLIC BLOOD PRESSURE: 81 MMHG | SYSTOLIC BLOOD PRESSURE: 133 MMHG | BODY MASS INDEX: 38.5 KG/M2 | OXYGEN SATURATION: 99 % | HEIGHT: 71 IN | WEIGHT: 275 LBS | HEART RATE: 73 BPM

## 2023-01-18 PROCEDURE — 20611 DRAIN/INJ JOINT/BURSA W/US: CPT | Mod: LT

## 2023-01-18 NOTE — REASON FOR VISIT
[Follow-Up Visit] : a follow-up visit for [Other: ____] : [unfilled] [FreeTextEntry2] : Left knee Euflexxa #2, Lot# [id number], Expires 2023/11/26

## 2023-01-18 NOTE — HISTORY OF PRESENT ILLNESS
[de-identified] : Patient is here for the second Euflexxa injection for the left knee. \par Allergies: The patient denies allergies to medications and has no allergies to chicken,eggs, or feathers.\par Procedure: The patient has been identified by name and date of birth. Patient confirms that we are treating the left knee today.\par The knee was prepped in the usual sterile fashion. The knee joint space was identified using ultrasound. The area was cleansed with chlorhexadine, then sprayed with ethyl chloride. The patient was then injected with the Euflexxa into the left knee using ultrasound guidance  and the needle position in the superolateral joint space was confirmed. The patient tolerated the procedure well. The medication was delivered aseptically and atraumatically.\par Diagnosis: Osteoarthritis of the left knee. \par Treatment: The patient was advised on the activities for today. I gave the patient instructions on postinjection ice and analgesia.\par The patient will follow up in one week for their next injection to be administered.

## 2023-01-25 ENCOUNTER — APPOINTMENT (OUTPATIENT)
Dept: ORTHOPEDIC SURGERY | Facility: CLINIC | Age: 53
End: 2023-01-25
Payer: COMMERCIAL

## 2023-01-25 VITALS
HEIGHT: 71 IN | SYSTOLIC BLOOD PRESSURE: 122 MMHG | DIASTOLIC BLOOD PRESSURE: 73 MMHG | WEIGHT: 275 LBS | OXYGEN SATURATION: 98 % | HEART RATE: 67 BPM | BODY MASS INDEX: 38.5 KG/M2

## 2023-01-25 DIAGNOSIS — M17.11 UNILATERAL PRIMARY OSTEOARTHRITIS, RIGHT KNEE: ICD-10-CM

## 2023-01-25 PROCEDURE — 20611 DRAIN/INJ JOINT/BURSA W/US: CPT | Mod: RT

## 2023-01-25 NOTE — REASON FOR VISIT
[Follow-Up Visit] : a follow-up visit for [Other: ____] : [unfilled] [FreeTextEntry2] : Right knee Euflexxa Injection #3, Lot#[id number], Expires 2023/11/26

## 2023-01-25 NOTE — HISTORY OF PRESENT ILLNESS
[de-identified] : Patient is here for the third Euflexxa injection for the right knee. \par Allergies: The patient denies allergies to medications and has no allergies to chicken,eggs, or feathers.\par Procedure: The patient has been identified by name and date of birth. Patient confirms that we are treating the right knee today.\par The knee was prepped in the usual sterile fashion. The knee joint space was identified using ultrasound. The area was cleansed with chlorhexadine, then sprayed with ethyl chloride. The patient was then injected with the Euflexxa into the right knee using ultrasound guidance  and the needle position in the superolateral joint space was confirmed. The patient tolerated the procedure well. The medication was delivered aseptically and atraumatically.\par Diagnosis: Osteoarthritis of the right knee. \par Treatment: The patient was advised on the activities for today. I gave the patient instructions on postinjection ice and analgesia.\par The patient will follow up in 6-8 weeks.

## 2023-03-31 ENCOUNTER — APPOINTMENT (OUTPATIENT)
Dept: ORTHOPEDIC SURGERY | Facility: CLINIC | Age: 53
End: 2023-03-31
Payer: COMMERCIAL

## 2023-03-31 VITALS
SYSTOLIC BLOOD PRESSURE: 127 MMHG | WEIGHT: 275 LBS | HEIGHT: 71 IN | HEART RATE: 76 BPM | DIASTOLIC BLOOD PRESSURE: 70 MMHG | BODY MASS INDEX: 38.5 KG/M2

## 2023-03-31 DIAGNOSIS — M17.12 UNILATERAL PRIMARY OSTEOARTHRITIS, LEFT KNEE: ICD-10-CM

## 2023-03-31 PROCEDURE — 99213 OFFICE O/P EST LOW 20 MIN: CPT

## 2023-03-31 RX ORDER — MELOXICAM 7.5 MG/1
7.5 TABLET ORAL
Qty: 60 | Refills: 0 | Status: ACTIVE | COMMUNITY
Start: 2023-03-31 | End: 1900-01-01

## 2023-03-31 NOTE — PHYSICAL EXAM
[de-identified] : Multi body exam \par The patient appears well nourished and in no apparent distress. The patient is alert and oriented to person, place, and time. Affect and mood appear normal. The head is normocephalic and atraumatic. The eyes reveal normal sclera and extra ocular muscles are intact. The tongue is midline with no apparent lesions. Skin shows normal turgor with no evidence of eczema or psoriasis. No respiratory distress noted. Sensation grossly intact.\par   [de-identified] : Exam left knee: Skin is within normal limits there is no effusion.  Range of motion 0 to 105 degrees of flexion with pain at deep flexion.  There is no medial or lateral joint line tenderness. [de-identified] : Prior X-ray 4 views left knee demonstrate bone-on-bone medial compartment and moderate to advanced patellofemoral compartment osteoarthritis.

## 2023-03-31 NOTE — DISCUSSION/SUMMARY
[de-identified] : Patient is a 52-year-old male with bilateral knee osteoarthritis. His left knee is currently doing much better following recent viscosupplementation. Patient will continue with home exercise and weight loss. I recommended a course of Mobic. The patient was given a prescription for the Mobic with directions. The patient was instructed to stop the medicine and call the office if there are any adverse reaction to the medicine. The patient was also instructed to consult with their primary care doctor prior to starting the medication. Follow up as needed.

## 2023-03-31 NOTE — HISTORY OF PRESENT ILLNESS
[de-identified] : Mr. GARY HONEYCUTT is a 52 year old male presenting for follow up evaluation. Left knee pain is localized medially and is worse with all weightbearing activity including walking any distance and using the stairs.  Patient denies any falls or trauma.   He tried physical therapy without relief.  He takes anti-inflammatories without significant improvement. He recently completed gel injections for the left knee on 1/25/23. he reports good relief from these.

## 2023-11-16 NOTE — ED ADULT TRIAGE NOTE - WEIGHT METHOD
There is no evidence of retinal pathology. All signs and symptoms of retinal detachment/tears explained in detail. Patient instructed to call the office if they experience increase in floaters, increase in flashes of light, loss of vision or curtain or veil effect. stated

## 2024-07-31 ENCOUNTER — APPOINTMENT (OUTPATIENT)
Dept: ORTHOPEDIC SURGERY | Facility: CLINIC | Age: 54
End: 2024-07-31
Payer: COMMERCIAL

## 2024-07-31 VITALS
DIASTOLIC BLOOD PRESSURE: 67 MMHG | HEIGHT: 71 IN | WEIGHT: 275 LBS | BODY MASS INDEX: 38.5 KG/M2 | SYSTOLIC BLOOD PRESSURE: 136 MMHG

## 2024-07-31 DIAGNOSIS — M17.12 UNILATERAL PRIMARY OSTEOARTHRITIS, LEFT KNEE: ICD-10-CM

## 2024-07-31 PROCEDURE — 99214 OFFICE O/P EST MOD 30 MIN: CPT | Mod: 25

## 2024-07-31 PROCEDURE — 73564 X-RAY EXAM KNEE 4 OR MORE: CPT | Mod: 26,LT

## 2024-07-31 PROCEDURE — 20610 DRAIN/INJ JOINT/BURSA W/O US: CPT | Mod: LT

## 2024-07-31 NOTE — PHYSICAL EXAM
[de-identified] : Multi body exam  The patient appears well nourished and in no apparent distress. The patient is alert and oriented to person, place, and time. Affect and mood appear normal. The head is normocephalic and atraumatic. The eyes reveal normal sclera and extra ocular muscles are intact. The tongue is midline with no apparent lesions. Skin shows normal turgor with no evidence of eczema or psoriasis. No respiratory distress noted. Sensation grossly intact.   [de-identified] : Exam left knee: Skin is within normal limits there is no effusion.  Range of motion 0 to 115 degrees of flexion with mild pain at deep flexion.  [de-identified] : X-ray 4 views left knee demonstrate bone-on-bone medial compartment osteoarthritis

## 2024-07-31 NOTE — DISCUSSION/SUMMARY
[de-identified] : Patient is a 54-year-old male with bone-on-bone osteoarthritis of his left knee.  Conservative and surgical options were discussed.  He is aware that eventually he will need a left total knee replacement.  He received a Depo-Medrol injection to the left knee and tolerated well.  Viscosupplementation was ordered as he is unwell with these in the past.  He will follow-up once these are approved.

## 2024-07-31 NOTE — HISTORY OF PRESENT ILLNESS
[de-identified] : Mr. GARY HONEYCUTT is a 54 year old male presenting for evaluation of longstanding left knee pain now worsening.  His left knee pain is localized medially and is worse with deep flexion as well as with weightbearing activity including walking long distance and rising from a seated position.  Patient has history of osteoarthritis of the left knee has been treated conservatively thus far.  In December 2023 he completed gel injections which worked very well up until recently.  He has tried physical therapy and anti-inflammatories for more than 6 weeks without improvement.  He presents today for follow-up and like to be conservative.

## 2024-07-31 NOTE — PROCEDURE
[de-identified] : Using sterile technique, 2cc of depomedrol 40mg/ml, 4cc of 1% plain lidocaine, and 2 cc 0.25% marcaine was drawn up into a sterile syringe. The left knee was then sterilely prepped with chlorhexidine. Ethyl chloride spray was used to anesthetize the skin and subQ tissue. The depomedrol/lidocaine/marcaine mixture was then injected into the knee joint in the anterolateral position. The patient tolerated the procedure well without difficulty. The patient was given instructions on the use of ice and anti-inflammatories post injection site soreness.

## 2024-08-01 RX ORDER — HYALURONATE SODIUM 20 MG/2 ML
20 SYRINGE (ML) INTRAARTICULAR
Qty: 2 | Refills: 0 | Status: ACTIVE | OUTPATIENT
Start: 2024-07-31

## 2024-10-16 ENCOUNTER — APPOINTMENT (OUTPATIENT)
Dept: ORTHOPEDIC SURGERY | Facility: CLINIC | Age: 54
End: 2024-10-16
Payer: COMMERCIAL

## 2024-10-16 VITALS — WEIGHT: 275 LBS | HEIGHT: 71 IN | BODY MASS INDEX: 38.5 KG/M2

## 2024-10-16 PROCEDURE — 99213 OFFICE O/P EST LOW 20 MIN: CPT | Mod: 25

## 2024-10-16 PROCEDURE — 20610 DRAIN/INJ JOINT/BURSA W/O US: CPT | Mod: LT

## 2024-10-23 ENCOUNTER — APPOINTMENT (OUTPATIENT)
Dept: ORTHOPEDIC SURGERY | Facility: CLINIC | Age: 54
End: 2024-10-23
Payer: COMMERCIAL

## 2024-10-23 VITALS — BODY MASS INDEX: 38.5 KG/M2 | HEIGHT: 71 IN | WEIGHT: 275 LBS

## 2024-10-23 PROCEDURE — 20610 DRAIN/INJ JOINT/BURSA W/O US: CPT | Mod: LT

## 2024-10-30 ENCOUNTER — APPOINTMENT (OUTPATIENT)
Dept: ORTHOPEDIC SURGERY | Facility: CLINIC | Age: 54
End: 2024-10-30
Payer: COMMERCIAL

## 2024-10-30 DIAGNOSIS — M17.12 UNILATERAL PRIMARY OSTEOARTHRITIS, LEFT KNEE: ICD-10-CM

## 2024-10-30 PROCEDURE — 20610 DRAIN/INJ JOINT/BURSA W/O US: CPT | Mod: LT

## 2024-11-29 NOTE — REASON FOR VISIT
Incoming call from pt       Pt states she's had vertigo since Sunday and states its been hard for her to get out of bed pt wants to know if there is someone available to see her tomorrow.     Please advise.    [Follow-Up Visit] : a follow-up visit for [Other: ____] : [unfilled]

## 2024-12-11 ENCOUNTER — APPOINTMENT (OUTPATIENT)
Dept: ORTHOPEDIC SURGERY | Facility: CLINIC | Age: 54
End: 2024-12-11

## 2024-12-26 ENCOUNTER — APPOINTMENT (OUTPATIENT)
Dept: ORTHOPEDIC SURGERY | Facility: CLINIC | Age: 54
End: 2024-12-26
Payer: COMMERCIAL

## 2024-12-26 ENCOUNTER — APPOINTMENT (OUTPATIENT)
Dept: ORTHOPEDIC SURGERY | Facility: CLINIC | Age: 54
End: 2024-12-26

## 2024-12-26 VITALS — WEIGHT: 275 LBS | HEIGHT: 71 IN | BODY MASS INDEX: 38.5 KG/M2

## 2024-12-26 VITALS
HEIGHT: 71 IN | DIASTOLIC BLOOD PRESSURE: 69 MMHG | SYSTOLIC BLOOD PRESSURE: 129 MMHG | WEIGHT: 275 LBS | BODY MASS INDEX: 38.5 KG/M2

## 2024-12-26 DIAGNOSIS — M17.12 UNILATERAL PRIMARY OSTEOARTHRITIS, LEFT KNEE: ICD-10-CM

## 2024-12-26 DIAGNOSIS — E11.9 TYPE 2 DIABETES MELLITUS W/OUT COMPLICATIONS: ICD-10-CM

## 2024-12-26 DIAGNOSIS — M76.61 ACHILLES TENDINITIS, RIGHT LEG: ICD-10-CM

## 2024-12-26 DIAGNOSIS — R29.898 OTHER SYMPTOMS AND SIGNS INVOLVING THE MUSCULOSKELETAL SYSTEM: ICD-10-CM

## 2024-12-26 DIAGNOSIS — M76.71 PERONEAL TENDINITIS, RIGHT LEG: ICD-10-CM

## 2024-12-26 DIAGNOSIS — M25.671 STIFFNESS OF RIGHT ANKLE, NOT ELSEWHERE CLASSIFIED: ICD-10-CM

## 2024-12-26 PROCEDURE — L1902: CPT | Mod: RT

## 2024-12-26 PROCEDURE — 20610 DRAIN/INJ JOINT/BURSA W/O US: CPT | Mod: LT

## 2024-12-26 PROCEDURE — 99204 OFFICE O/P NEW MOD 45 MIN: CPT

## 2024-12-26 PROCEDURE — 99213 OFFICE O/P EST LOW 20 MIN: CPT | Mod: 25

## 2024-12-26 RX ORDER — CELECOXIB 200 MG/1
200 CAPSULE ORAL DAILY
Qty: 30 | Refills: 1 | Status: ACTIVE | COMMUNITY
Start: 2024-12-26 | End: 1900-01-01

## 2024-12-26 RX ORDER — TIRZEPATIDE 7.5 MG/.5ML
INJECTION, SOLUTION SUBCUTANEOUS
Refills: 0 | Status: ACTIVE | COMMUNITY

## 2025-02-13 ENCOUNTER — APPOINTMENT (OUTPATIENT)
Dept: ORTHOPEDIC SURGERY | Facility: CLINIC | Age: 55
End: 2025-02-13

## 2025-02-25 ENCOUNTER — APPOINTMENT (OUTPATIENT)
Dept: ORTHOPEDIC SURGERY | Facility: CLINIC | Age: 55
End: 2025-02-25

## 2025-03-07 ENCOUNTER — APPOINTMENT (OUTPATIENT)
Dept: ORTHOPEDIC SURGERY | Facility: CLINIC | Age: 55
End: 2025-03-07
Payer: COMMERCIAL

## 2025-03-07 DIAGNOSIS — M76.61 ACHILLES TENDINITIS, RIGHT LEG: ICD-10-CM

## 2025-03-07 PROCEDURE — 99213 OFFICE O/P EST LOW 20 MIN: CPT

## 2025-05-12 RX ORDER — HYALURONATE SODIUM 20 MG/2 ML
20 SYRINGE (ML) INTRAARTICULAR
Qty: 1 | Refills: 0 | Status: ACTIVE | OUTPATIENT
Start: 2025-05-07

## 2025-05-15 RX ORDER — HYALURONATE SODIUM 30 MG/2 ML
30 SYRINGE (ML) INTRAARTICULAR
Qty: 3 | Refills: 0 | Status: ACTIVE | OUTPATIENT
Start: 2025-05-13

## 2025-05-21 ENCOUNTER — APPOINTMENT (OUTPATIENT)
Dept: ORTHOPEDIC SURGERY | Facility: CLINIC | Age: 55
End: 2025-05-21

## 2025-06-03 ENCOUNTER — NON-APPOINTMENT (OUTPATIENT)
Age: 55
End: 2025-06-03

## 2025-06-04 ENCOUNTER — APPOINTMENT (OUTPATIENT)
Dept: ORTHOPEDIC SURGERY | Facility: CLINIC | Age: 55
End: 2025-06-04
Payer: COMMERCIAL

## 2025-06-04 VITALS — HEIGHT: 71 IN | WEIGHT: 275 LBS | BODY MASS INDEX: 38.5 KG/M2

## 2025-06-04 DIAGNOSIS — M17.12 UNILATERAL PRIMARY OSTEOARTHRITIS, LEFT KNEE: ICD-10-CM

## 2025-06-04 PROCEDURE — 20610 DRAIN/INJ JOINT/BURSA W/O US: CPT | Mod: LT

## 2025-06-13 ENCOUNTER — APPOINTMENT (OUTPATIENT)
Dept: ORTHOPEDIC SURGERY | Facility: CLINIC | Age: 55
End: 2025-06-13
Payer: COMMERCIAL

## 2025-06-13 VITALS — WEIGHT: 275 LBS | HEIGHT: 71 IN | BODY MASS INDEX: 38.5 KG/M2

## 2025-06-13 PROCEDURE — 20610 DRAIN/INJ JOINT/BURSA W/O US: CPT | Mod: RT

## 2025-06-20 ENCOUNTER — APPOINTMENT (OUTPATIENT)
Dept: ORTHOPEDIC SURGERY | Facility: CLINIC | Age: 55
End: 2025-06-20
Payer: COMMERCIAL

## 2025-06-20 PROCEDURE — 20610 DRAIN/INJ JOINT/BURSA W/O US: CPT | Mod: LT

## 2025-06-26 ENCOUNTER — APPOINTMENT (OUTPATIENT)
Dept: ORTHOPEDIC SURGERY | Facility: CLINIC | Age: 55
End: 2025-06-26
Payer: COMMERCIAL

## 2025-06-26 PROCEDURE — 73610 X-RAY EXAM OF ANKLE: CPT | Mod: RT

## 2025-06-26 PROCEDURE — 99213 OFFICE O/P EST LOW 20 MIN: CPT

## 2025-07-31 ENCOUNTER — APPOINTMENT (OUTPATIENT)
Dept: ORTHOPEDIC SURGERY | Facility: CLINIC | Age: 55
End: 2025-07-31
Payer: COMMERCIAL

## 2025-07-31 VITALS — BODY MASS INDEX: 38.5 KG/M2 | WEIGHT: 275 LBS | HEIGHT: 71 IN

## 2025-07-31 DIAGNOSIS — R29.898 OTHER SYMPTOMS AND SIGNS INVOLVING THE MUSCULOSKELETAL SYSTEM: ICD-10-CM

## 2025-07-31 DIAGNOSIS — M76.61 ACHILLES TENDINITIS, RIGHT LEG: ICD-10-CM

## 2025-07-31 PROCEDURE — 99213 OFFICE O/P EST LOW 20 MIN: CPT

## 2025-09-11 ENCOUNTER — APPOINTMENT (OUTPATIENT)
Dept: ORTHOPEDIC SURGERY | Facility: CLINIC | Age: 55
End: 2025-09-11